# Patient Record
Sex: FEMALE | Race: WHITE | Employment: OTHER | URBAN - METROPOLITAN AREA
[De-identification: names, ages, dates, MRNs, and addresses within clinical notes are randomized per-mention and may not be internally consistent; named-entity substitution may affect disease eponyms.]

---

## 2017-01-03 ENCOUNTER — TRANSFERRED RECORDS (OUTPATIENT)
Dept: HEALTH INFORMATION MANAGEMENT | Facility: CLINIC | Age: 72
End: 2017-01-03

## 2017-03-02 ENCOUNTER — HOSPITAL ENCOUNTER (OUTPATIENT)
Dept: CARDIAC REHAB | Facility: CLINIC | Age: 72
End: 2017-03-02
Attending: PHYSICIAN ASSISTANT
Payer: MEDICARE

## 2017-03-02 VITALS — HEIGHT: 64 IN | WEIGHT: 160.8 LBS | BODY MASS INDEX: 27.45 KG/M2

## 2017-03-02 PROCEDURE — 93798 PHYS/QHP OP CAR RHAB W/ECG: CPT | Performed by: OCCUPATIONAL THERAPIST

## 2017-03-02 PROCEDURE — 40000575 ZZH STATISTIC OP CARDIAC VISIT #2: Performed by: OCCUPATIONAL THERAPIST

## 2017-03-02 PROCEDURE — 40000116 ZZH STATISTIC OP CR VISIT: Performed by: OCCUPATIONAL THERAPIST

## 2017-03-02 PROCEDURE — 93797 PHYS/QHP OP CAR RHAB WO ECG: CPT | Mod: 59 | Performed by: OCCUPATIONAL THERAPIST

## 2017-03-02 ASSESSMENT — 6 MINUTE WALK TEST (6MWT)
MALE CALC: 1415.32
PREDICTED: 1423.95
GENDER SELECTION: FEMALE
FEMALE CALC: 1398.8
TOTAL DISTANCE WALKED (FT): 505

## 2017-03-02 NOTE — PROGRESS NOTES
Heather Martin 71 year old  03/02/17 0800   Session   Session Initial Evaluation and Exercise Prescription   Certified through this date 03/31/17   I have established, reviewed and made necessary changes to the individualized treatment plan and exercise prescription for this patient.    Physician Name (printed): ________________________   Date: _______  Time: ______    Physician Signature: ___________________________________________   Cardiac Rehab Assessment   Cardiac Rehab Assessment On 12/31/16 Pt had SOB and was admitted to [facility] where she had CABG x2. She attended TCU for 4 weeks where she worked with physical therapists and nutritionists in order to help prepare her to live at home alone. She is struggling with making changes to her diet because she only cooks for herself. Pt is interested in attending nutrition 1 and 2 in order to learn more about healthy food options for 1. She is also interested in attending stress management class as she has had some stress with her recent health problems. She has used reading as a way to distract herself from stress, however, she would like to learn more options for stress management. Pt is deconditioned and has not been exercising due to fear that something will happen. She would like to gain confidence in order to help her start exercising at her gym in her apartment conplex at least 2x a week. Pt's BMI is 27 but she is not interested in losing weight as she has already lost 40lbs in the last year. She has arthritis; hip and knee replacement on the right side.  Pt is on Metformin but does not check her BG levels at home. She is currently using a cane for confidence as she does not have very good balance right now. Skilled therapy is needed for education on nutrition and stress management, behavioral counseling in exercise adherance as well as continued monitored exercise in order to gain confidence to return to ADLs. The patient's history and clinical status  including hemodynamics and ECG were evaluated.  The patient was assessed to be stable and appropriate to begin exercise.   The patient's functional capacity and exercise prescription were determined by the completion of the 6 minute walk test.  See results below.  The patient was oriented to the program.  Risk factor profile was completed. Goals and objectives were discussed. CV response was WNL. No symptoms, complaints or pain were reported. Good prognosis for reaching above goals. Skilled therapy is necessary in order to monitor CV response to exercise, to provide education on risk factors and behavior change counseling needed to achieve patient's goals.  Plan to progress to 30-40 minutes of exercise prior to discharge from cardiac rehab. Will start with shorter bouts initially.  Initial THR of 20-30 beats above RHR; Effort rating of 4-6.  Initiate muscle conditioning as appropriate.  Provide risk factor education and behavior change counseling.     General Information   Treatment Diagnosis Coronary Artery Bypass Surgery   Date of Treatment Diagnosis 01/01/17   Significant Past CV History None   Comorbidities DM   Lead up symptoms Couldn't breathe, twinge in chest   Hospital Location [facility]   Hospital Discharge Date 01/07/17   Signs and Symptoms Post Hospital Discharge Appetite  (decreased appitite, celulitis w/ some drainage on left leg-inner knee)   Comments Pt was in the TCU for 4 weeks. She lives alone    Outpatient Cardiac Rehab Start Date 03/02/17   Primary Physician Ryanne Gonzáles    Primary Physician Follow Up Completed   Surgeon Dr Placido Rodriguez    Surgeon Follow Up NA   Cardiologist Dr. Chen    Cardiologist Follow Up Completed   Ejection Fraction <35%    Risk Stratification High   Summary of Cath Report   Summary of Cath Report Available   Date Performed 01/09/17   Left Main 90% stenosis   LAD 80% ostial stenosis   RCA 80% stenosis   Living and Work Status    Living Arrangements and Social  "Status independent living facility   Support System Live alone   Return to Employment Retired   Occupation Transit  for [employer]   Preventative Medications   CMS recommended medications Beta Blocker;Antiplatelets;Lipid Lowering;Influenza vaccination;Pneumonia vaccination   Falls Screen   Have you fallen two or more times in the past year? No   Have you fallen and had an injury in the past year? No   Referral Initiated to Physical Therapy Patient recently attended Physical Therapy   Pain   Patient Currently in Pain No   Physical Assessments   Incisions WNL   Edema +2 Mild   Right Lung Sounds normal   Left Lung Sounds normal   Limitations Orthopedic;Arthritis  (knee and hip replacement on right side)   Comments Pt had a hip and knee replacement on the right side but has not had any pain since the [facility].    Individualized Treatment Plan   Monitored Sessions Scheduled 24   Monitored Sessions Attended 1   Oxygen   Supplemental Oxygen needed No   Nutrition Management - Weight Management   Assessment Initial Assessment   Age 71   Weight 72.9 kg (160 lb 12.8 oz)   Height 1.626 m (5' 4\")   BMI (Calculated) 27.66   Initial Rate Your Plate Score. Dietary tool to assess eating patterns. Scores range from 24 to 72. The higher the score the healthier the eating pattern. 50   Weight Management Comments Pt has some swelling from fluid in ankles. Pt weighs herself every day and is pretty happy with her weight right now. She was recently taken off her diuretic. Pt does not want to go below 150# as she has already lost 40# in the last year    Nutrition Management - Lipids   Lipids Labs Not Available   Prescribed Lipid Medication Yes   Statin Intensity High Intensity   Lipid Comments Pt will bring in a list of her lipid panel    Nutrition Management - Diabetes   Diabetes Type II   Do you Monitor BS at Home? No   Diabetes Medication Prescribed Yes, Oral Medication   Diabetes Comments Pt will have A1C in April and will " bring that report in    Nutrition Management Summary   Dietary Recommendations Low Sodium;Low Fat  (Doesnt remember )   Stages of Change for Diet Compliance Contemplation   Interventions Planned Attend Nutrition Education Class(es);Educate on Benefits of Exercise;Instruct on Label Reading;Refer for Individual Diet Consultation   Patient Goals Goal #1   Goal #1 Description Pt will attend nutrition 1 and 2 in order to learn more about how to cook low sodium and fat diet for 1 person.    Goal #1 Target Date 04/19/17   Nutrition Summary Comments Pt lives by herself and finds it difficult to maintain a low sodium diet with all of the prepackaged foods. She would like to learn more about how to lower sodium and fat and what foods she is allowed to eat.    Nutrition Target Outcome Hb A1C < 7.0;Total Chol < 150, HDL > 40 (M), HDL > 50 (W), LDL < 70, Trig < 150;BMI < 25   Psychosocial Management   Psychosocial Assessment Initial   Is there history of clinical depression or increased risk of depression? No previous history   Current Level of Stress per Patient Report Mild   Current Coping Skills Uses Stress Management/Relaxation Techniques   Initial Patient Health Questionnaire -9 Score (PHQ-9) for depression. 5-9 Minimal symptoms, 10-14 Minor depression, 15-19 Major depression, moderately severe, > 20 Major depression, severe  0   Initial [facility] Survey score.  Quality of Life:   If total score > 25 review individual areas where patient rated a 4 or 5.  Consider patients current medical condition and what role that plays on the score.   Adjust treatment protocol to improve areas of concern.  Consider the following:  PHQ9 score, DASI, and re-assessment within the next 30 days to assist with developing treatments.  16   Stages of Change Contemplation   Interventions Planned Patient to verbalize understanding of behavioral assessment results;Patient to verbalize understanding of negative impact of stress to personal  health;Patient to attend stress management class(es);Patient interested in implementing one strategy to reduce current level of stress/anxiety;Patient will practice coping/stress management techniques;Provide resources for stress relaxation   Patient Goal Yes   Goal Description Pt will attend the stress management class to learn techniques on how to manage stress    Goal Target Date 04/26/17   Psychosocial Comments Pt has never delt with depression and doesnt think it is or will be a problem. She does have some stress from her health and great neices and nephews and would like to find some more techniques for managing stress.    Psychosocial Target Outcome Maximize coping skills   Other Core Components - Hypertension   History of or Diagnosis of Hypertension Yes   Currently taking Anti-Hypertensives Yes;Beta blocker   Other Core Components - Tobacco   History of Tobacco Use Yes   Quit Date or Planned Quit Date (1997)   Tobacco Use Status Former (Quit > 6 mo ago)   Tobacco Habit Cigarettes   Tobacco Use per Day (average) 1   Years of Tobacco Use 30    Stages of Change Maintenance   Other Core Components Summary   Interventions Planned Attend education class on Blood Pressure;List benefits of weight management;Educate on importance of maintaining low sodium diet;Educate on the use of 'Stop Light' tool;Educate on importance of monitoring daily weight;Attend education class(es) on Nutrition   Activity/Exercise History   Activity/Exercise Assessment Initial   Activity/Exercise Status prior to event? Sedentary   Number of Days Currently participating in Moderate Physical Activity? 0   Number of Days Currently performing  Aerobic Exercise (including rehab)? 0   Number of Minutes per Session Currently of Aerobic Exercise (average)? 0   Current Stage of Change (Physical Activity) Contemplation   Current Stage of Change (Aerobic Exercise) Preparation   Patient Goals Goal #1   Goal #1 Description Pt will attend rehab 2-3 x  weeks and will exercise 1-2x a week at her home gym.    Goal #1 Target Date 03/23/17   Goal #1 Progress Towards Goal Pt has access to TM and NuStep at her gym in her apartment complex   Activity/Exercise Comments Pt was sedentary pre-op and would like to gain confidence in order to exercise on her own. Currently, she is very timid about doing anything activity related without being monitored.    Activity/Exercise Target Outcome An Accumulation of 150  Minutes of Aerobic Activity per Week   Exercise Assessment   6 Minute Walk Predicted - Gender Selection Female   6 Minute Walk Predicted (Male) 1415.32   6 Minute Walk Predicted (Female) 1398.8   Initial 6 Minute Walk Distance (Feet) 505 ft   Resting HR 61 bpm   Exercise HR 75 bpm   Post Exercise HR 66 bpm   Resting /60   Exercise /68   Post Exercise /62   Effort Rating 5   Current MET Level 1.7   MET Level Goal 3-3.5   ECG Rhythm Normal sinus rhythm   Ectopy None   Current Symptoms Fatigue   Limitations/Restrictions Orthopedic (see comments)  (Knee and hip replacement )   Exercise Prescription   Mode Nustep;Treadmill;Weights;Arm Ergometer   Duration/Time 15-30 min   Frequency 3 daysweek   THR (85% of age predicted max HR) 125.8   OMNI Effort Rating (0-10 Scale) 4-6/10   Progression Total exercise time of 20-30 minutes;Aerobic exercise to OMNI rating of 6 or below and at or below THR;Progress peak intensity by 1/4 MET per week;Intermittent bouts   Comments Pt has access to NuStep at home gym but has never tried the TM before. May need some assistance for the first session or two.    Recommended Home Exercise   Type of Exercise Walking;Other (comments);Treadmill  (NuStep )   Frequency (days per week) 1-2   Duration (minutes per session) 15-30 min   Effort Rating Recommended 4-6/10   30 Day Exercise Plan Pt will work out at rehab 2-3x a week and 1-2 x a week for 20minutes at her gym at home.    Current Home Exercise   Type of Exercise None    Follow-up/On-going Support   Provider follow-up needed on the following No follow-up needed   Learning Assessment   Learner Patient   Primary Language English   Preferred Learning Style (hands on )   Barriers to Learning No barriers noted   Patient Education   Education recommended Anatomy and Physiology of the Heart;Blood Pressure;Exercise Principles;Medication Overview;Nutrition;Stress Management

## 2017-03-15 ENCOUNTER — HOSPITAL ENCOUNTER (OUTPATIENT)
Dept: CARDIAC REHAB | Facility: CLINIC | Age: 72
End: 2017-03-15
Attending: PHYSICIAN ASSISTANT
Payer: MEDICARE

## 2017-03-15 PROCEDURE — 93798 PHYS/QHP OP CAR RHAB W/ECG: CPT | Performed by: REHABILITATION PRACTITIONER

## 2017-03-15 PROCEDURE — 40000116 ZZH STATISTIC OP CR VISIT: Performed by: REHABILITATION PRACTITIONER

## 2017-03-17 ENCOUNTER — HOSPITAL ENCOUNTER (OUTPATIENT)
Dept: CARDIAC REHAB | Facility: CLINIC | Age: 72
End: 2017-03-17
Attending: PHYSICIAN ASSISTANT
Payer: MEDICARE

## 2017-03-17 PROCEDURE — 40000116 ZZH STATISTIC OP CR VISIT

## 2017-03-17 PROCEDURE — 93798 PHYS/QHP OP CAR RHAB W/ECG: CPT

## 2017-03-17 ASSESSMENT — 6 MINUTE WALK TEST (6MWT)
GENDER SELECTION: FEMALE
TOTAL DISTANCE WALKED (FT): 505

## 2017-03-20 ENCOUNTER — HOSPITAL ENCOUNTER (OUTPATIENT)
Dept: CARDIAC REHAB | Facility: CLINIC | Age: 72
End: 2017-03-20
Attending: PHYSICIAN ASSISTANT
Payer: MEDICARE

## 2017-03-20 VITALS — BODY MASS INDEX: 27.68 KG/M2 | WEIGHT: 162.1 LBS | HEIGHT: 64 IN

## 2017-03-20 PROCEDURE — 93798 PHYS/QHP OP CAR RHAB W/ECG: CPT | Performed by: REHABILITATION PRACTITIONER

## 2017-03-20 PROCEDURE — 40000116 ZZH STATISTIC OP CR VISIT: Performed by: REHABILITATION PRACTITIONER

## 2017-03-20 ASSESSMENT — 6 MINUTE WALK TEST (6MWT)
GENDER SELECTION: FEMALE
MALE CALC: 1412.92
FEMALE CALC: 1394.64
PREDICTED: 1421.53
TOTAL DISTANCE WALKED (FT): 505

## 2017-03-20 NOTE — PROGRESS NOTES
03/20/17 1300   Session  Heather Martin  1945    Physician cosignature/electronic signature indicates approval of this ITP document. I have established, reviewed and made necessary changes to the individualized treatment plan and exercise prescription for this patient.       Session 30 Day Individualized Treatment Plan  (Progress update done)   Certified through this date 04/22/17   Cardiac Rehab Assessment   Cardiac Rehab Assessment On 12/31/16 Pt had SOB and was admitted to [facility] where she had CABG x2. She attended TCU for 4 weeks where she worked with physical therapists and nutritionists in order to help prepare her to live at home alone. She is struggling with making changes to her diet because she only cooks for herself. Pt is interested in attending nutrition 1 and 2 in order to learn more about healthy food options for 1. She is also interested in attending stress management class as she has had some stress with her recent health problems. She has used reading as a way to distract herself from stress, however, she would like to learn more options for stress management. Pt is deconditioned and has not been exercising due to fear that something will happen. She would like to gain confidence in order to help her start exercising at her gym in her apartment conplex at least 2x a week. Pt's BMI is 27 but she is not interested in losing weight as she has already lost 40lbs in the last year. She has arthritis; hip and knee replacement on the right side.  Pt is on Metformin but does not check her BG levels at home. She is currently using a cane for confidence as she does not have very good balance right now. 3/20/17 Progress update done today. PT has been making gradual progress with rehab. She has returned to most ADL's. She is having some anxiety, and has made an appointment with her primary MD at the beginning of April. She plans to attend most of the education classes and is looking forward to  learning more about heart healthy eating. She has been using the Nustep at her place of residence. They have a workout room there. Skilled therapy is needed for education on nutrition and stress management, behavioral counseling in exercise adherance as well as continued monitored exercise in order to gain confidence to return to ADLs.    General Information   Treatment Diagnosis Coronary Artery Bypass Surgery   Date of Treatment Diagnosis 01/01/17   Significant Past CV History None   Comorbidities DM   Lead up symptoms Couldn't breathe, twinge in chest   Hospital Location [facility]   Hospital Discharge Date 01/07/17   Signs and Symptoms Post Hospital Discharge Anxiety  (Appetite is improved)   Outpatient Cardiac Rehab Start Date 03/02/17   Primary Physician Ryanne Gonzáles    Primary Physician Follow Up Completed  (she has another appointment scheduled in April)   Surgeon Dr Placido Rodriguez    Surgeon Follow Up NA   Cardiologist Dr. Chen    Cardiologist Follow Up Completed   Ejection Fraction <35%    Risk Stratification High   Summary of Cath Report   Summary of Cath Report Available   Date Performed 01/09/17   Left Main 90% stenosis   LAD 80% ostial stenosis   RCA 80% stenosis   Living and Work Status    Living Arrangements and Social Status independent living facility   Support System Live alone   Return to Employment Retired   Occupation Transit  for [employer]   Preventative Medications   CMS recommended medications Beta Blocker;Antiplatelets;Lipid Lowering;Influenza vaccination;Pneumonia vaccination   Falls Screen   Have you fallen two or more times in the past year? No   Have you fallen and had an injury in the past year? No   Referral Initiated to Physical Therapy Patient recently attended Physical Therapy   Pain   Patient Currently in Pain No   Physical Assessments   Incisions WNL   Edema +1 Trace   Right Lung Sounds normal   Left Lung Sounds normal   Limitations  "Orthopedic;Arthritis  (knee and hip replacement on right side)   Comments Pt had a hip and knee replacement on the right side but has not had any pain since the sugery.    Individualized Treatment Plan   Monitored Sessions Scheduled 24   Monitored Sessions Attended 4   Oxygen   Supplemental Oxygen needed No   Nutrition Management - Weight Management   Assessment Re-assessment   Age 71   Weight 73.5 kg (162 lb 1.6 oz)   Height 1.626 m (5' 4.02\")   BMI (Calculated) 27.87   Initial Rate Your Plate Score. Dietary tool to assess eating patterns. Scores range from 24 to 72. The higher the score the healthier the eating pattern. 50   Weight Management Comments PT weighs herself every day and is pretty happy with her weight right now.  Pt does not want to go below 150# as she has already lost 40# in the last year    Nutrition Management - Lipids   Lipids Labs Not Available   Prescribed Lipid Medication Yes   Statin Intensity High Intensity   Lipid Comments Pt will bring in a list of her lipid panel    Nutrition Management - Diabetes   Diabetes Type II   Do you Monitor BS at Home? No   Diabetes Medication Prescribed Yes, Oral Medication   Diabetes Comments Pt will have A1C in April and will bring that report in    Nutrition Management Summary   Dietary Recommendations Low Sodium;Low Fat  (Doesnt remember )   Stages of Change for Diet Compliance Preparation   Interventions Planned Attend Nutrition Education Class(es);Educate on Benefits of Exercise;Instruct on Label Reading;Refer for Individual Diet Consultation   Patient Goals Goal #1   Goal #1 Description Pt will attend nutrition 1 and 2 in order to learn more about how to cook low sodium and fat diet for 1 person.    Goal #1 Target Date 04/19/17   Goal #1 Progress Towards Goal 3/20/17 PT will attend nutrition classes as offered throughout the rehab. She has been looking on the computer for heart healthy recipes. She eats out about 1 time per week.    Nutrition Summary " Comments Pt lives by herself and finds it difficult to maintain a low sodium diet with all of the prepackaged foods. She would like to learn more about how to lower sodium and fat and what foods she is allowed to eat.    Nutrition Target Outcome Hb A1C < 7.0;Total Chol < 150, HDL > 40 (M), HDL > 50 (W), LDL < 70, Trig < 150;BMI < 25   Psychosocial Management   Psychosocial Assessment Re-assessment   Is there history of clinical depression or increased risk of depression? No previous history   Current Level of Stress per Patient Report Mild   Current Coping Skills Uses Stress Management/Relaxation Techniques   Initial Patient Health Questionnaire -9 Score (PHQ-9) for depression. 5-9 Minimal symptoms, 10-14 Minor depression, 15-19 Major depression, moderately severe, > 20 Major depression, severe  0   Initial [facility] Survey score.  Quality of Life:   If total score > 25 review individual areas where patient rated a 4 or 5.  Consider patients current medical condition and what role that plays on the score.   Adjust treatment protocol to improve areas of concern.  Consider the following:  PHQ9 score, DASI, and re-assessment within the next 30 days to assist with developing treatments.  16   Stages of Change Contemplation   Interventions Planned Patient to verbalize understanding of behavioral assessment results;Patient to verbalize understanding of negative impact of stress to personal health;Patient to attend stress management class(es);Patient interested in implementing one strategy to reduce current level of stress/anxiety;Patient will practice coping/stress management techniques;Provide resources for stress relaxation   Patient Goal Yes   Goal Description Pt will attend the stress management class to learn techniques on how to manage stress    Goal Target Date 04/26/17   Progress Towards Goal 3/20/17 PT will attend classes. She has been waking up with some anxiety regarding her diagnosis. She will follow up  with her primary MD.    Psychosocial Comments Pt has never delt with depression and doesnt think it is or will be a problem. She does have some stress from her health and great neices and nephews and would like to find some more techniques for managing stress.    Psychosocial Target Outcome Maximize coping skills   Other Core Components - Hypertension   History of or Diagnosis of Hypertension Yes   Currently taking Anti-Hypertensives Yes;Beta blocker   Other Core Components - Tobacco   History of Tobacco Use Yes   Quit Date or Planned Quit Date (1997)   Tobacco Use Status Former (Quit > 6 mo ago)   Tobacco Habit Cigarettes   Tobacco Use per Day (average) 1   Years of Tobacco Use 30    Stages of Change Maintenance   Other Core Components Summary   Interventions Planned Attend education class on Blood Pressure;List benefits of weight management;Educate on importance of maintaining low sodium diet;Educate on the use of 'Stop Light' tool;Educate on importance of monitoring daily weight;Attend education class(es) on Nutrition   Activity/Exercise History   Activity/Exercise Assessment Re-assessment   Activity/Exercise Status prior to event? Sedentary   Number of Days Currently participating in Moderate Physical Activity? 0   Number of Days Currently performing  Aerobic Exercise (including rehab)? 3  (plus 20 minutes on days off from rehab)   Number of Minutes per Session Currently of Aerobic Exercise (average)? 20 at home, 30 in rehab   Current Stage of Change (Physical Activity) Preparation   Current Stage of Change (Aerobic Exercise) Action   Patient Goals Goal #1   Goal #1 Description Pt will attend rehab 2-3 x weeks and will exercise 1-2x a week at her home gym.    Goal #1 Target Date 03/23/17   Goal #1 Progress Towards Goal Pt has access to TM and NuStep at her gym in her apartment complex 3/20/17 PT has been using her Nustep at home for 20 minutes on days off from rehab.    Activity/Exercise Comments Pt was  sedentary pre-op and would like to gain confidence in order to exercise on her own. Currently, she is very timid about doing anything activity related without being monitored.    Activity/Exercise Target Outcome An Accumulation of 150  Minutes of Aerobic Activity per Week   Exercise Assessment   6 Minute Walk Predicted - Gender Selection Female   6 Minute Walk Predicted (Male) 1412.92   6 Minute Walk Predicted (Female) 1394.64   Initial 6 Minute Walk Distance (Feet) 505 ft   Resting HR 72 bpm   Exercise HR 90 bpm   Post Exercise HR 62 bpm   Resting /68   Exercise /78   Post Exercise BP 80/50   Effort Rating 5   Current MET Level 2.5   MET Level Goal 3-3.5   ECG Rhythm Normal sinus rhythm   Ectopy None   Current Symptoms Fatigue   Limitations/Restrictions Orthopedic (see comments)  (Knee and hip replacement )   Exercise Prescription   Mode Nustep;Treadmill;Weights;Arm Ergometer   Duration/Time 15-30 min   Frequency 3 daysweek   THR (85% of age predicted max HR) 125.8   OMNI Effort Rating (0-10 Scale) 4-6/10   Progression Total exercise time of 20-30 minutes;Aerobic exercise to OMNI rating of 6 or below and at or below THR;Progress peak intensity by 1/4 MET per week;Intermittent bouts   Comments Pt has access to NuStep at home gym but has never tried the TM before. May need some assistance for the first session or two.    Recommended Home Exercise   Type of Exercise Walking;Other (comments);Treadmill  (NuStep )   Frequency (days per week) 1-2   Duration (minutes per session) 15-30 min   Effort Rating Recommended 4-6/10   30 Day Exercise Plan Pt will work out at rehab 2-3x a week and 1-2 x a week for 20minutes at her gym at home.    Current Home Exercise   Type of Exercise Other (comments)   Frequency (days per week) on days off from rehab   Duration (minutes per session) 20   Follow-up/On-going Support   Provider follow-up needed on the following Other (see comments)   Learning Assessment   Learner Patient    Primary Language English   Preferred Learning Style (hands on )   Barriers to Learning No barriers noted   Patient Education   Education recommended Anatomy and Physiology of the Heart;Blood Pressure;Exercise Principles;Medication Overview;Nutrition;Stress Management

## 2017-03-22 ENCOUNTER — HOSPITAL ENCOUNTER (OUTPATIENT)
Dept: CARDIAC REHAB | Facility: CLINIC | Age: 72
End: 2017-03-22
Attending: PHYSICIAN ASSISTANT
Payer: MEDICARE

## 2017-03-22 PROCEDURE — 40000116 ZZH STATISTIC OP CR VISIT: Performed by: OCCUPATIONAL THERAPIST

## 2017-03-22 PROCEDURE — 93798 PHYS/QHP OP CAR RHAB W/ECG: CPT | Performed by: OCCUPATIONAL THERAPIST

## 2017-03-24 ENCOUNTER — HOSPITAL ENCOUNTER (OUTPATIENT)
Dept: CARDIAC REHAB | Facility: CLINIC | Age: 72
End: 2017-03-24
Attending: PHYSICIAN ASSISTANT
Payer: MEDICARE

## 2017-03-24 VITALS — BODY MASS INDEX: 27.84 KG/M2 | WEIGHT: 163.1 LBS | HEIGHT: 64 IN

## 2017-03-24 PROCEDURE — 40000575 ZZH STATISTIC OP CARDIAC VISIT #2: Performed by: REHABILITATION PRACTITIONER

## 2017-03-24 PROCEDURE — 93797 PHYS/QHP OP CAR RHAB WO ECG: CPT | Mod: 59 | Performed by: REHABILITATION PRACTITIONER

## 2017-03-24 PROCEDURE — 40000116 ZZH STATISTIC OP CR VISIT

## 2017-03-24 PROCEDURE — 93798 PHYS/QHP OP CAR RHAB W/ECG: CPT

## 2017-03-24 ASSESSMENT — 6 MINUTE WALK TEST (6MWT)
FEMALE CALC: 1391.23
MALE CALC: 1410.29
TOTAL DISTANCE WALKED (FT): 505
PREDICTED: 1418.89
GENDER SELECTION: FEMALE

## 2017-03-27 ENCOUNTER — HOSPITAL ENCOUNTER (OUTPATIENT)
Dept: CARDIAC REHAB | Facility: CLINIC | Age: 72
End: 2017-03-27
Attending: PHYSICIAN ASSISTANT
Payer: MEDICARE

## 2017-03-27 PROCEDURE — 93797 PHYS/QHP OP CAR RHAB WO ECG: CPT | Performed by: OCCUPATIONAL THERAPIST

## 2017-03-27 PROCEDURE — 40000116 ZZH STATISTIC OP CR VISIT: Performed by: OCCUPATIONAL THERAPIST

## 2017-03-27 PROCEDURE — 93798 PHYS/QHP OP CAR RHAB W/ECG: CPT | Performed by: OCCUPATIONAL THERAPIST

## 2017-03-27 PROCEDURE — 40000575 ZZH STATISTIC OP CARDIAC VISIT #2: Performed by: OCCUPATIONAL THERAPIST

## 2017-03-27 ASSESSMENT — 6 MINUTE WALK TEST (6MWT)
GENDER SELECTION: FEMALE
TOTAL DISTANCE WALKED (FT): 505

## 2017-03-27 NOTE — PROGRESS NOTES
Heather Martin 71 year old   03/27/17 1300   Session   Session 60 Day Individualized Treatment Plan  (Progress update done)   Certified through this date 05/13/17   Cardiac Rehab Assessment   Cardiac Rehab Assessment On 12/31/16 Pt had SOB and was admitted to [facility] where she had CABG x2. She attended TCU for 4 weeks where she worked with physical therapists and nutritionists in order to help prepare her to live at home alone. She is struggling with making changes to her diet because she only cooks for herself. Pt is interested in attending nutrition 1 and 2 in order to learn more about healthy food options for 1. She is also interested in attending stress management class as she has had some stress with her recent health problems. She has used reading as a way to distract herself from stress, however, she would like to learn more options for stress management. Pt is deconditioned and has not been exercising due to fear that something will happen. She would like to gain confidence in order to help her start exercising at her gym in her apartment conplex at least 2x a week. Pt's BMI is 27 but she is not interested in losing weight as she has already lost 40lbs in the last year. She has arthritis; hip and knee replacement on the right side.  Pt is on Metformin but does not check her BG levels at home. She is currently using a cane for confidence as she does not have very good balance right now. 3/20/17 Progress update done today. PT has been making gradual progress with rehab. She has returned to most ADL's. She is having some anxiety, and has made an appointment with her primary MD at the beginning of April. She plans to attend most of the education classes and is looking forward to learning more about heart healthy eating. She has been using the Nustep at her place of residence. They have a workout room there. Skilled therapy is needed for education on nutrition and stress management, behavioral  counseling in exercise adherance as well as continued monitored exercise in order to gain confidence to return to ADLs.  3/24/17. 1:1 consult today.  Reviewed risk factor report. Overall PT is feeling well  She is progressing per POC. She has made several diet changes for healthy eating and diabetes control. She is starting to exercise at home.  SHe is hoping to be able to independent of her walker by completion of OPCR.  Recommended PT discuss with MD her A1c, EF, consult with diabetes educator and consulting with endocrinilogist for diabetes management.  Continue with skilled therapy for increased exercise tolerance, risk factor education and behavior change counsling on diet and exercise.   General Information   Treatment Diagnosis Coronary Artery Bypass Surgery   Date of Treatment Diagnosis 01/01/17   Significant Past CV History None   Comorbidities DM   Lead up symptoms Couldn't breathe, twinge in chest   Hospital Location [facility]   Hospital Discharge Date 01/07/17   Signs and Symptoms Post Hospital Discharge Anxiety  (Appetite is improved)   Outpatient Cardiac Rehab Start Date 03/02/17   Primary Physician Ryanne Gonzáles    Primary Physician Follow Up Completed  (she has another appointment scheduled in April)   Surgeon Dr Placido Rodriguez    Surgeon Follow Up NA   Cardiologist Dr. Chen    Cardiologist Follow Up Completed   Ejection Fraction <35%    Risk Stratification High   Summary of Cath Report   Summary of Cath Report Available   Date Performed 01/09/17   Left Main 90% stenosis   LAD 80% ostial stenosis   RCA 80% stenosis   Living and Work Status    Living Arrangements and Social Status independent living facility   Support System Live alone   Return to Employment Retired   Occupation Transit  for [employer]   Preventative Medications   CMS recommended medications Beta Blocker;Antiplatelets;Lipid Lowering;Influenza vaccination;Pneumonia vaccination   Falls Screen   Have you fallen two  or more times in the past year? No   Have you fallen and had an injury in the past year? No   Referral Initiated to Physical Therapy Patient recently attended Physical Therapy   Pain   Patient Currently in Pain No   Physical Assessments   Incisions WNL   Edema +1 Trace   Right Lung Sounds normal   Left Lung Sounds normal   Limitations Orthopedic;Arthritis  (knee and hip replacement on right side)   Comments Pt had a hip and knee replacement on the right side but has not had any pain since the sugery.    Individualized Treatment Plan   Monitored Sessions Scheduled 24   Monitored Sessions Attended 6   Oxygen   Supplemental Oxygen needed No   Nutrition Management - Weight Management   Assessment Re-assessment   Age 71   Initial Rate Your Plate Score. Dietary tool to assess eating patterns. Scores range from 24 to 72. The higher the score the healthier the eating pattern. 50   Weight Management Comments PT weighs herself every day and is pretty happy with her weight right now.  Pt does not want to go below 150# as she has already lost 40# in the last year    Nutrition Management - Lipids   Lipids Labs Available   Date 03/10/17   Total Cholesterol 140   Triglycerides 140   HDL 29   LDL 83   Prescribed Lipid Medication Yes   Statin Intensity High Intensity   Lipid Comments Pt will bring in a list of her lipid panel  3/24/17.  Reviewed values and recommneded outcome goals.   Nutrition Management - Diabetes   Diabetes Type II   Do you Monitor BS at Home? No   Diabetes Medication Prescribed Yes, Oral Medication   Diabetes Comments Pt will have A1C in April and will bring that report in    Nutrition Management Summary   Dietary Recommendations Low Sodium;Low Fat  (Doesnt remember )   Stages of Change for Diet Compliance Preparation   Interventions Planned Attend Nutrition Education Class(es);Educate on Benefits of Exercise;Instruct on Label Reading;Refer for Individual Diet Consultation   Patient Goals Goal #1   Goal #1  Description Pt will attend nutrition 1 and 2 in order to learn more about how to cook low sodium and fat diet for 1 person.    Goal #1 Target Date 04/19/17   Goal #1 Progress Towards Goal 3/20/17 PT will attend nutrition classes as offered throughout the rehab. She has been looking on the computer for heart healthy recipes. She eats out about 1 time per week.  3/24/17Planning to attend nutrition education classes.  She has made sevarl diet changes including no white bread or pastiries, increased fruit and fish, decreased sodium.   Nutrition Summary Comments Pt lives by herself and finds it difficult to maintain a low sodium diet with all of the prepackaged foods. She would like to learn more about how to lower sodium and fat and what foods she is allowed to eat.    Nutrition Target Outcome Hb A1C < 7.0;Total Chol < 150, HDL > 40 (M), HDL > 50 (W), LDL < 70, Trig < 150;BMI < 25   Psychosocial Management   Psychosocial Assessment Re-assessment   Is there history of clinical depression or increased risk of depression? No previous history   Current Level of Stress per Patient Report Mild   Current Coping Skills Uses Stress Management/Relaxation Techniques   Initial Patient Health Questionnaire -9 Score (PHQ-9) for depression. 5-9 Minimal symptoms, 10-14 Minor depression, 15-19 Major depression, moderately severe, > 20 Major depression, severe  0   Initial [facility] Survey score.  Quality of Life:   If total score > 25 review individual areas where patient rated a 4 or 5.  Consider patients current medical condition and what role that plays on the score.   Adjust treatment protocol to improve areas of concern.  Consider the following:  PHQ9 score, DASI, and re-assessment within the next 30 days to assist with developing treatments.  16   Stages of Change Contemplation   Interventions Planned Patient to verbalize understanding of behavioral assessment results;Patient to verbalize understanding of negative impact of  stress to personal health;Patient to attend stress management class(es);Patient interested in implementing one strategy to reduce current level of stress/anxiety;Patient will practice coping/stress management techniques;Provide resources for stress relaxation   Patient Goal Yes   Goal Description Pt will attend the stress management class to learn techniques on how to manage stress    Goal Target Date 04/26/17   Progress Towards Goal 3/20/17 PT will attend classes. She has been waking up with some anxiety regarding her diagnosis. She will follow up with her primary MD.  3/24/17.  She did not mention any anxiety today.  She will attend class on 3/27/17.   Psychosocial Comments Pt has never delt with depression and doesnt think it is or will be a problem. She does have some stress from her health and great neices and nephews and would like to find some more techniques for managing stress.    Psychosocial Target Outcome Maximize coping skills   Other Core Components - Hypertension   History of or Diagnosis of Hypertension Yes   Currently taking Anti-Hypertensives Yes;Beta blocker   Hypertension Comments 3/24/17.  BP well controlled.   Other Core Components - Tobacco   History of Tobacco Use Yes   Quit Date or Planned Quit Date (1997)   Tobacco Use Status Former (Quit > 6 mo ago)   Tobacco Habit Cigarettes   Tobacco Use per Day (average) 1   Years of Tobacco Use 30    Stages of Change Maintenance   Other Core Components Summary   Interventions Planned Attend education class on Blood Pressure;List benefits of weight management;Educate on importance of maintaining low sodium diet;Educate on the use of 'Stop Light' tool;Educate on importance of monitoring daily weight;Attend education class(es) on Nutrition   Activity/Exercise History   Activity/Exercise Assessment Re-assessment   Activity/Exercise Status prior to event? Sedentary   Number of Days Currently participating in Moderate Physical Activity? 0   Number of Days  Currently performing  Aerobic Exercise (including rehab)? 3  (plus 20 minutes on days off from rehab)   Number of Minutes per Session Currently of Aerobic Exercise (average)? 20 at home, 30 in rehab   Current Stage of Change (Physical Activity) Preparation   Current Stage of Change (Aerobic Exercise) Preparation   Patient Goals Goal #1   Goal #1 Description Pt will attend rehab 2-3 x weeks and will exercise 1-2x a week at her home gym.    Goal #1 Target Date 03/23/17   Goal #1 Progress Towards Goal Pt has access to TM and NuStep at her gym in her apartment complex 3/20/17 PT has been using her Nustep at home for 20 minutes on days off from rehab.  3/24/17.  Attending rehab regularly. She is just starting to exercise outside of rehab (1 time to date).   Activity/Exercise Comments Pt was sedentary pre-op and would like to gain confidence in order to exercise on her own. Currently, she is very timid about doing anything activity related without being monitored.    Activity/Exercise Target Outcome An Accumulation of 150  Minutes of Aerobic Activity per Week   Exercise Assessment   6 Minute Walk Predicted - Gender Selection Female   Initial 6 Minute Walk Distance (Feet) 505 ft   Resting HR 69 bpm   Exercise HR 90 bpm   Post Exercise HR 62 bpm   Resting /62   Exercise /62   Post Exercise /54   Pre  mg/dL   Post  mg/dL   Effort Rating 5   Current MET Level 2.5   MET Level Goal 3-3.5   ECG Rhythm Normal sinus rhythm   Ectopy None   Current Symptoms Denies symptoms   Limitations/Restrictions Orthopedic (see comments)  (Knee and hip replacement )   Exercise Prescription   Mode Nustep;Treadmill;Weights;Arm Ergometer   Duration/Time 15-30 min   Frequency 3 daysweek   THR (85% of age predicted max HR) 125.8   OMNI Effort Rating (0-10 Scale) 4-6/10   Progression Total exercise time of 20-30 minutes;Aerobic exercise to OMNI rating of 6 or below and at or below THR;Progress peak intensity by 1/4 MET  per week;Intermittent bouts   Comments Pt has access to NuStep at home gym but has never tried the TM before. May need some assistance for the first session or two.    Recommended Home Exercise   Type of Exercise Walking;Other (comments);Treadmill  (NuStep )   Frequency (days per week) 1-2   Duration (minutes per session) 15-30 min   Effort Rating Recommended 4-6/10   30 Day Exercise Plan Pt will work out at rehab 2-3x a week and 1-2 x a week for 20minutes at her gym at home.    Current Home Exercise   Type of Exercise None  (planning to start this week.)   Follow-up/On-going Support   Provider follow-up needed on the following Other (see comments)   Learning Assessment   Learner Patient   Primary Language English   Preferred Learning Style (hands on )   Barriers to Learning No barriers noted   Patient Education   Education recommended Anatomy and Physiology of the Heart;Blood Pressure;Exercise Principles;Medication Overview;Nutrition;Stress Management   Education classes attended Stress Management   Physician cosignature/electronic signature indicates approval of this ITP document. I have established, reviewed and made necessary changes to the individualized treatment plan and exercise prescription for this patient.

## 2017-03-29 ENCOUNTER — HOSPITAL ENCOUNTER (OUTPATIENT)
Dept: CARDIAC REHAB | Facility: CLINIC | Age: 72
End: 2017-03-29
Attending: PHYSICIAN ASSISTANT
Payer: MEDICARE

## 2017-03-29 PROCEDURE — 40000575 ZZH STATISTIC OP CARDIAC VISIT #2

## 2017-03-29 PROCEDURE — 93797 PHYS/QHP OP CAR RHAB WO ECG: CPT

## 2017-03-29 PROCEDURE — 40000116 ZZH STATISTIC OP CR VISIT: Performed by: REHABILITATION PRACTITIONER

## 2017-03-29 PROCEDURE — 93798 PHYS/QHP OP CAR RHAB W/ECG: CPT | Performed by: REHABILITATION PRACTITIONER

## 2017-03-31 ENCOUNTER — HOSPITAL ENCOUNTER (OUTPATIENT)
Dept: CARDIAC REHAB | Facility: CLINIC | Age: 72
End: 2017-03-31
Attending: PHYSICIAN ASSISTANT
Payer: MEDICARE

## 2017-03-31 LAB — GLUCOSE BLDC GLUCOMTR-MCNC: 147 MG/DL (ref 70–99)

## 2017-03-31 PROCEDURE — 40000116 ZZH STATISTIC OP CR VISIT: Performed by: OCCUPATIONAL THERAPIST

## 2017-03-31 PROCEDURE — 00000146 ZZHCL STATISTIC GLUCOSE BY METER IP

## 2017-03-31 PROCEDURE — 93798 PHYS/QHP OP CAR RHAB W/ECG: CPT | Performed by: OCCUPATIONAL THERAPIST

## 2017-04-03 ENCOUNTER — HOSPITAL ENCOUNTER (OUTPATIENT)
Dept: CARDIAC REHAB | Facility: CLINIC | Age: 72
End: 2017-04-03
Attending: PHYSICIAN ASSISTANT
Payer: MEDICARE

## 2017-04-03 PROCEDURE — 93798 PHYS/QHP OP CAR RHAB W/ECG: CPT

## 2017-04-03 PROCEDURE — 40000116 ZZH STATISTIC OP CR VISIT

## 2017-04-05 ENCOUNTER — HOSPITAL ENCOUNTER (OUTPATIENT)
Dept: CARDIAC REHAB | Facility: CLINIC | Age: 72
End: 2017-04-05
Attending: PHYSICIAN ASSISTANT
Payer: MEDICARE

## 2017-04-05 LAB
GLUCOSE BLDC GLUCOMTR-MCNC: 138 MG/DL (ref 70–99)
GLUCOSE BLDC GLUCOMTR-MCNC: 97 MG/DL (ref 70–99)

## 2017-04-05 PROCEDURE — 93797 PHYS/QHP OP CAR RHAB WO ECG: CPT | Mod: 59 | Performed by: REHABILITATION PRACTITIONER

## 2017-04-05 PROCEDURE — 40000575 ZZH STATISTIC OP CARDIAC VISIT #2: Performed by: REHABILITATION PRACTITIONER

## 2017-04-05 PROCEDURE — 00000146 ZZHCL STATISTIC GLUCOSE BY METER IP

## 2017-04-05 PROCEDURE — 40000116 ZZH STATISTIC OP CR VISIT: Performed by: REHABILITATION PRACTITIONER

## 2017-04-05 PROCEDURE — 93798 PHYS/QHP OP CAR RHAB W/ECG: CPT | Performed by: REHABILITATION PRACTITIONER

## 2017-04-07 ENCOUNTER — HOSPITAL ENCOUNTER (OUTPATIENT)
Dept: CARDIAC REHAB | Facility: CLINIC | Age: 72
End: 2017-04-07
Attending: PHYSICIAN ASSISTANT
Payer: MEDICARE

## 2017-04-07 PROCEDURE — 40000116 ZZH STATISTIC OP CR VISIT: Performed by: REHABILITATION PRACTITIONER

## 2017-04-07 PROCEDURE — 93798 PHYS/QHP OP CAR RHAB W/ECG: CPT | Performed by: REHABILITATION PRACTITIONER

## 2017-04-14 ENCOUNTER — HOSPITAL ENCOUNTER (OUTPATIENT)
Dept: CARDIAC REHAB | Facility: CLINIC | Age: 72
End: 2017-04-14
Attending: PHYSICIAN ASSISTANT
Payer: MEDICARE

## 2017-04-14 PROCEDURE — 93798 PHYS/QHP OP CAR RHAB W/ECG: CPT

## 2017-04-14 PROCEDURE — 40000116 ZZH STATISTIC OP CR VISIT

## 2017-04-17 ENCOUNTER — HOSPITAL ENCOUNTER (OUTPATIENT)
Dept: CARDIAC REHAB | Facility: CLINIC | Age: 72
End: 2017-04-17
Attending: PHYSICIAN ASSISTANT
Payer: MEDICARE

## 2017-04-17 VITALS — WEIGHT: 164 LBS | BODY MASS INDEX: 28.14 KG/M2

## 2017-04-17 PROCEDURE — 93797 PHYS/QHP OP CAR RHAB WO ECG: CPT | Mod: 59 | Performed by: OCCUPATIONAL THERAPIST

## 2017-04-17 PROCEDURE — 40000116 ZZH STATISTIC OP CR VISIT: Performed by: REHABILITATION PRACTITIONER

## 2017-04-17 PROCEDURE — 93798 PHYS/QHP OP CAR RHAB W/ECG: CPT | Performed by: REHABILITATION PRACTITIONER

## 2017-04-17 PROCEDURE — 40000575 ZZH STATISTIC OP CARDIAC VISIT #2: Performed by: OCCUPATIONAL THERAPIST

## 2017-04-17 ASSESSMENT — 6 MINUTE WALK TEST (6MWT)
GENDER SELECTION: FEMALE
TOTAL DISTANCE WALKED (FT): 505

## 2017-04-17 NOTE — PROGRESS NOTES
Heather Martin 71 year old   3/2/2017 CABG 04/17/17 1400   Session   Session 90 Day Individualized Treatment Plan   Certified through this date 06/10/17   Physician cosignature/electronic signature indicates approval of this ITP document. I have established, reviewed and made necessary changes to the individualized treatment plan and exercise prescription for this patient.   Cardiac Rehab Assessment   Cardiac Rehab Assessment Pt is deconditioned and has not been exercising due to fear that something will happen. She would like to gain confidence in order to help her start exercising at her gym in her apartment [facility] at least 2x a week. Pt's BMI is 27 but she is not interested in losing weight as she has already lost 40lbs in the last year. She has arthritis; hip and knee replacement on the right side.  Pt is on Metformin but does not check her BG levels at home. She is currently using a cane for confidence as she does not have very good balance right now. 3/20/17 Progress update done today. PT has been making gradual progress with rehab. She has returned to most ADL's. She is having some anxiety, and has made an appointment with her primary MD at the beginning of April. She plans to attend most of the education classes and is looking forward to learning more about heart healthy eating. She has been using the Nustep at her place of residence. They have a workout room there. Skilled therapy is needed for education on nutrition and stress management, behavioral counseling in exercise adherance as well as continued monitored exercise in order to gain confidence to return to ADLs.  3/24/17. 1:1 consult today.  Reviewed risk factor report. Overall PT is feeling well  She is progressing per POC. She has made several diet changes for healthy eating and diabetes control. She is starting to exercise at home.  SHe is hoping to be able to independent of her walker by completion of OPCR.  Recommended PT discuss with MD her  A1c, EF, consult with diabetes educator and consulting with endocrinilogist for diabetes management.  Continue with skilled therapy for increased exercise tolerance, risk factor education and behavior change counsling on diet and exercise. 4/17/2017 PT is slowly progressing with the exercise levels. She has attended several of the education classes and is making changes to her diet. Her A1c is now 5.7. PT is feeling more confident with her exercise program and enjoys the support she receives in OPCR. PT would benefit from skilled therapy for support/counseling with the lifestyle changes with diet and exercise to reduce her CV risk.   General Information   Treatment Diagnosis Coronary Artery Bypass Surgery   Date of Treatment Diagnosis 01/01/17   Significant Past CV History None   Comorbidities DM   Lead up symptoms Couldn't breathe, twinge in chest   Hospital Location [facility]   Hospital Discharge Date 01/07/17   Signs and Symptoms Post Hospital Discharge Anxiety  (Appetite is improved)   Outpatient Cardiac Rehab Start Date 03/02/17   Primary Physician Ryanne Gonzáles    Primary Physician Follow Up Completed  (she has another appointment scheduled in April)   Surgeon Dr Placido Rodriguez    Surgeon Follow Up NA   Cardiologist Dr. Chen    Cardiologist Follow Up Completed   Ejection Fraction <35%    Risk Stratification High   Summary of Cath Report   Summary of Cath Report Available   Date Performed 01/09/17   Left Main 90% stenosis   LAD 80% ostial stenosis   RCA 80% stenosis   Living and Work Status    Living Arrangements and Social Status independent living facility   Support System Live alone   Return to Employment Retired   Occupation Transit  for [employer]   Preventative Medications   CMS recommended medications Beta Blocker;Antiplatelets;Lipid Lowering;Influenza vaccination;Pneumonia vaccination   Falls Screen   Have you fallen two or more times in the past year? No   Have you fallen and had  an injury in the past year? No   Referral Initiated to Physical Therapy Patient recently attended Physical Therapy   Pain   Patient Currently in Pain No   Physical Assessments   Incisions WNL   Edema +2 Mild   Right Lung Sounds not assessed   Left Lung Sounds not assessed   Limitations Orthopedic;Arthritis  (knee and hip replacement on right side)   Comments Pt had a hip and knee replacement on the right side but has not had any pain since the sugery.    Individualized Treatment Plan   Monitored Sessions Scheduled 24   Monitored Sessions Attended 14   Oxygen   Supplemental Oxygen needed No   Nutrition Management - Weight Management   Assessment Re-assessment   Age 71   Weight 74.4 kg (164 lb)   Initial Rate Your Plate Score. Dietary tool to assess eating patterns. Scores range from 24 to 72. The higher the score the healthier the eating pattern. 50   Weight Management Comments PT weighs herself every day and is pretty happy with her weight right now.  Pt does not want to go below 150# as she has already lost 40# in the last year. 4/17/2017 PT is hoping to maintain her weight at the current level as she does not want to regain the weight she has lost. Her weight is up slightly today, but she attributes it to overindulging easter weekend.    Nutrition Management - Lipids   Lipids Labs Available   Date 03/10/17   Total Cholesterol 140   Triglycerides 140   HDL 29   LDL 83   Prescribed Lipid Medication Yes   Statin Intensity High Intensity   Lipid Comments Pt will bring in a list of her lipid panel  3/24/17.  Reviewed values and recommneded outcome goals.   Nutrition Management - Diabetes   Diabetes Type II   Do you Monitor BS at Home? No   Diabetes Medication Prescribed Yes, Oral Medication   Hb A1C Date: 04/12/17   Hb A1C Result: 5.7   Diabetes Comments Pt will have A1C in April and will bring that report in. 4/17/2017 New A1c indicates her diabetes is well-controlled.   Nutrition Management Summary   Dietary  Recommendations Low Sodium;Low Fat  (Doesnt remember )   Stages of Change for Diet Compliance Preparation   Interventions Planned Attend Nutrition Education Class(es);Educate on Benefits of Exercise;Instruct on Label Reading;Refer for Individual Diet Consultation   Interventions In Progress or Completed Attended Nutrition Class(es);Understands how to accurately read Food Labels   Patient Goals Goal #1   Goal #1 Description Pt will attend nutrition 1 and 2 in order to learn more about how to cook low sodium and fat diet for 1 person.    Goal #1 Target Date 04/19/17   Goal #1 Progress Towards Goal 3/20/17 PT will attend nutrition classes as offered throughout the rehab. She has been looking on the computer for heart healthy recipes. She eats out about 1 time per week.  3/24/17Planning to attend nutrition education classes.  She has made sevarl diet changes including no white bread or pastiries, increased fruit and fish, decreased sodium. 4/17/2017 PT has attended the nutrition 2 class and plans to attend Nutrition 1 this week. She is reading labels and not adding any salt to food. She has a handout on sodium on her cupboard that she refers to to help keep her on track. Given handout on spices to use instead of salt for flavoring food.   Nutrition Summary Comments Pt lives by herself and finds it difficult to maintain a low sodium diet with all of the prepackaged foods. She would like to learn more about how to lower sodium and fat and what foods she is allowed to eat.    Nutrition Target Outcome Hb A1C < 7.0;Total Chol < 150, HDL > 40 (M), HDL > 50 (W), LDL < 70, Trig < 150;BMI < 25   Psychosocial Management   Psychosocial Assessment Re-assessment   Is there history of clinical depression or increased risk of depression? No previous history   Current Level of Stress per Patient Report Mild   Current Coping Skills Uses Stress Management/Relaxation Techniques   Initial Patient Health Questionnaire -9 Score (PHQ-9) for  depression. 5-9 Minimal symptoms, 10-14 Minor depression, 15-19 Major depression, moderately severe, > 20 Major depression, severe  0   Initial [facility] Survey score.  Quality of Life:   If total score > 25 review individual areas where patient rated a 4 or 5.  Consider patients current medical condition and what role that plays on the score.   Adjust treatment protocol to improve areas of concern.  Consider the following:  PHQ9 score, DASI, and re-assessment within the next 30 days to assist with developing treatments.  16   Stages of Change Contemplation   Interventions Planned Patient to verbalize understanding of behavioral assessment results;Patient to verbalize understanding of negative impact of stress to personal health;Patient to attend stress management class(es);Patient interested in implementing one strategy to reduce current level of stress/anxiety;Patient will practice coping/stress management techniques;Provide resources for stress relaxation   Interventions In Progress or Completed Patient verbalizes understanding of negative impact of stress to personal health;Patient attended stress management class(es);Patient is currently practicing coping/relaxation techniques;Patient was given resources for stress relaxation   Patient Goal Yes   Goal Description Pt will attend the stress management class to learn techniques on how to manage stress    Goal Target Date 04/26/17   Progress Towards Goal 3/20/17 PT will attend classes. She has been waking up with some anxiety regarding her diagnosis. She will follow up with her primary MD.  3/24/17.  She did not mention any anxiety today.  She will attend class on 3/27/17.   Psychosocial Comments Pt has never delt with depression and doesnt think it is or will be a problem. She does have some stress from her health and great neices and nephews and would like to find some more techniques for managing stress. PT is also planning to start chair yoga with is  offered twice a month where she resides.   Psychosocial Target Outcome Maximize coping skills   Other Core Components - Hypertension   History of or Diagnosis of Hypertension Yes   Currently taking Anti-Hypertensives Yes;Beta blocker   Hypertension Comments 3/24/17.  BP well controlled. 4/17/2017 BP has been higher some sessions. Will continue to monitor.   Other Core Components - Tobacco   History of Tobacco Use Yes   Quit Date or Planned Quit Date (1997)   Tobacco Use Status Former (Quit > 6 mo ago)   Tobacco Habit Cigarettes   Tobacco Use per Day (average) 1   Years of Tobacco Use 30    Stages of Change Maintenance   Other Core Components Summary   Interventions Planned Attend education class on Blood Pressure;List benefits of weight management;Educate on importance of maintaining low sodium diet;Educate on the use of 'Stop Light' tool;Educate on importance of monitoring daily weight;Attend education class(es) on Nutrition;Instruct patient on the DASH diet   Interventions In Progress or Completed Instructed on DASH diet;Educated on importance of maintaining low sodium diet   Other Core Components Comments 4/17/2017 Given written information on the DASH diet. PT is watching her salt intake and weight.   Activity/Exercise History   Activity/Exercise Assessment Re-assessment   Activity/Exercise Status prior to event? Sedentary   Number of Days Currently participating in Moderate Physical Activity? 0   Number of Days Currently performing  Aerobic Exercise (including rehab)? 4-5  (plus 20 minutes on days off from rehab)   Number of Minutes per Session Currently of Aerobic Exercise (average)? 30-35   Current Stage of Change (Physical Activity) Preparation   Current Stage of Change (Aerobic Exercise) Preparation   Patient Goals Goal #1   Goal #1 Description Pt will attend rehab 2-3 x weeks and will exercise 1-2x a week at her home gym.    Goal #1 Target Date 03/23/17   Goal #1 Date Met 04/17/17   Goal #1 Progress  Towards Goal Pt has access to TM and NuStep at her gym in her apartment complex 3/20/17 PT has been using her Nustep at home for 20 minutes on days off from rehab.  3/24/17.  Attending rehab regularly. She is just starting to exercise outside of rehab (1 time to date). 4/17/2017 PT is going to her wellness room to exercise where she lives 2 days per week for 30-35 minutes of exercise. She does the nustep and sometimes the TM for a short amount of time. Goal Met.   Activity/Exercise Comments Pt was sedentary pre-op and would like to gain confidence in order to exercise on her own. Currently, she is very timid about doing anything activity related without being monitored.    Activity/Exercise Target Outcome An Accumulation of 150  Minutes of Aerobic Activity per Week   Exercise Assessment   6 Minute Walk Predicted - Gender Selection Female   Initial 6 Minute Walk Distance (Feet) 505 ft   Resting HR 67 bpm   Exercise HR 80 bpm   Post Exercise HR 60 bpm   Resting /60   Exercise /76   Post Exercise /64   Pre  mg/dL   Post  mg/dL   Effort Rating 6   Current MET Level 2.8   MET Level Goal 3-3.5   ECG Rhythm Normal sinus rhythm   Ectopy None   Current Symptoms Denies symptoms   Limitations/Restrictions Orthopedic (see comments)  (Knee and hip replacement )   Exercise Prescription   Mode Nustep;Treadmill;Weights;Arm Ergometer   Duration/Time 30-45 min   Frequency 3 daysweek   THR (85% of age predicted max HR) 125.8   OMNI Effort Rating (0-10 Scale) 4-6/10   Progression Aerobic exercise to OMNI rating of 6 or below and at or below THR;Progress peak intensity by 1/4 MET per week;Total exercise time of 30-45 minutes;Continuous bouts   Comments Pt has access to NuStep at home gym but has never tried the TM before. May need some assistance for the first session or two.    Recommended Home Exercise   Type of Exercise Walking;Other (comments);Treadmill  (NuStep )   Frequency (days per week) 1-2    Duration (minutes per session) 30-45 min   Effort Rating Recommended 4-6/10   30 Day Exercise Plan Pt will work out at rehab 2-3x a week and 1-2 x a week for 20minutes at her gym at home.    Current Home Exercise   Type of Exercise Walking;Treadmill;Other (comments)  (Nustep)   Frequency (days per week) 2   Duration (minutes per session) 30-35   Follow-up/On-going Support   Provider follow-up needed on the following Blood Pressure   Comments /60 today at rest and has been higher some sessions.   Learning Assessment   Learner Patient   Primary Language English   Preferred Learning Style (hands on )   Barriers to Learning No barriers noted   Patient Education   Education recommended Anatomy and Physiology of the Heart;Blood Pressure;Exercise Principles;Medication Overview;Nutrition;Stress Management   Education classes attended Stress Management;Muscle Conditioning;Nutrition

## 2017-04-19 ENCOUNTER — HOSPITAL ENCOUNTER (OUTPATIENT)
Dept: CARDIAC REHAB | Facility: CLINIC | Age: 72
End: 2017-04-19
Attending: PHYSICIAN ASSISTANT
Payer: MEDICARE

## 2017-04-19 PROCEDURE — 93798 PHYS/QHP OP CAR RHAB W/ECG: CPT | Performed by: OCCUPATIONAL THERAPIST

## 2017-04-19 PROCEDURE — 40000116 ZZH STATISTIC OP CR VISIT: Performed by: OCCUPATIONAL THERAPIST

## 2017-04-21 ENCOUNTER — HOSPITAL ENCOUNTER (OUTPATIENT)
Dept: CARDIAC REHAB | Facility: CLINIC | Age: 72
End: 2017-04-21
Attending: PHYSICIAN ASSISTANT
Payer: MEDICARE

## 2017-04-21 PROCEDURE — 40000116 ZZH STATISTIC OP CR VISIT: Performed by: REHABILITATION PRACTITIONER

## 2017-04-21 PROCEDURE — 93798 PHYS/QHP OP CAR RHAB W/ECG: CPT | Performed by: REHABILITATION PRACTITIONER

## 2017-04-24 ENCOUNTER — HOSPITAL ENCOUNTER (OUTPATIENT)
Dept: CARDIAC REHAB | Facility: CLINIC | Age: 72
End: 2017-04-24
Attending: PHYSICIAN ASSISTANT
Payer: MEDICARE

## 2017-04-24 PROCEDURE — 40000116 ZZH STATISTIC OP CR VISIT: Performed by: REHABILITATION PRACTITIONER

## 2017-04-24 PROCEDURE — 93798 PHYS/QHP OP CAR RHAB W/ECG: CPT | Performed by: REHABILITATION PRACTITIONER

## 2017-04-26 ENCOUNTER — HOSPITAL ENCOUNTER (OUTPATIENT)
Dept: CARDIAC REHAB | Facility: CLINIC | Age: 72
End: 2017-04-26
Attending: PHYSICIAN ASSISTANT
Payer: MEDICARE

## 2017-04-26 PROCEDURE — 40000116 ZZH STATISTIC OP CR VISIT: Performed by: REHABILITATION PRACTITIONER

## 2017-04-26 PROCEDURE — 93798 PHYS/QHP OP CAR RHAB W/ECG: CPT | Performed by: REHABILITATION PRACTITIONER

## 2017-04-28 ENCOUNTER — HOSPITAL ENCOUNTER (OUTPATIENT)
Dept: CARDIAC REHAB | Facility: CLINIC | Age: 72
End: 2017-04-28
Attending: PHYSICIAN ASSISTANT
Payer: MEDICARE

## 2017-04-28 PROCEDURE — 93798 PHYS/QHP OP CAR RHAB W/ECG: CPT

## 2017-04-28 PROCEDURE — 40000116 ZZH STATISTIC OP CR VISIT

## 2017-05-01 ENCOUNTER — HOSPITAL ENCOUNTER (OUTPATIENT)
Dept: CARDIAC REHAB | Facility: CLINIC | Age: 72
End: 2017-05-01
Attending: PHYSICIAN ASSISTANT
Payer: MEDICARE

## 2017-05-01 PROCEDURE — 93798 PHYS/QHP OP CAR RHAB W/ECG: CPT | Performed by: OCCUPATIONAL THERAPIST

## 2017-05-01 PROCEDURE — 40000116 ZZH STATISTIC OP CR VISIT: Performed by: OCCUPATIONAL THERAPIST

## 2017-05-01 PROCEDURE — 93797 PHYS/QHP OP CAR RHAB WO ECG: CPT | Performed by: OCCUPATIONAL THERAPIST

## 2017-05-01 PROCEDURE — 40000575 ZZH STATISTIC OP CARDIAC VISIT #2: Performed by: OCCUPATIONAL THERAPIST

## 2017-05-04 ENCOUNTER — HOSPITAL ENCOUNTER (OUTPATIENT)
Dept: CARDIAC REHAB | Facility: CLINIC | Age: 72
End: 2017-05-04
Attending: PHYSICIAN ASSISTANT
Payer: MEDICARE

## 2017-05-04 VITALS — BODY MASS INDEX: 28.13 KG/M2 | WEIGHT: 164.8 LBS | HEIGHT: 64 IN

## 2017-05-04 PROCEDURE — 93798 PHYS/QHP OP CAR RHAB W/ECG: CPT | Performed by: OCCUPATIONAL THERAPIST

## 2017-05-04 PROCEDURE — 40000116 ZZH STATISTIC OP CR VISIT: Performed by: OCCUPATIONAL THERAPIST

## 2017-05-04 ASSESSMENT — 6 MINUTE WALK TEST (6MWT)
TOTAL DISTANCE WALKED (FT): 505
FEMALE CALC: 1385.42
GENDER SELECTION: FEMALE
PREDICTED: 1414.4
TOTAL DISTANCE WALKED (FT): 504
MALE CALC: 1405.83

## 2017-05-04 NOTE — PROGRESS NOTES
05/04/17 1000   Session   Session Discharge Note   Certified through this date 06/10/17   Physician cosignature/electronic signature indicates agreements with the ITP document and approval of discharge.   Discharge Summary: PT is walking with a cane. She is now exercising at the facility where she lives. She attended many of the classes including Nutrition. She is making more healthy choices. Her mobility has improved to the point she is able to climb stairs without fatigue or SOB. PT is planning to continue to exercise at home. Her BP has been higher some days in the 140-150/54-60 range at rest. PT plans to continue to monitor this. All questions were answered and she verbalized understanding of the information.    Cardiac Rehab Assessment   Cardiac Rehab Assessment Pt is deconditioned and has not been exercising due to fear that something will happen. She would like to gain confidence in order to help her start exercising at her gym in her apartment conplex at least 2x a week. Pt's BMI is 27 but she is not interested in losing weight as she has already lost 40lbs in the last year. She has arthritis; hip and knee replacement on the right side.  Pt is on Metformin but does not check her BG levels at home. She is currently using a cane for confidence as she does not have very good balance right now. 3/20/17 Progress update done today. PT has been making gradual progress with rehab. She has returned to most ADL's. She is having some anxiety, and has made an appointment with her primary MD at the beginning of April. She plans to attend most of the education classes and is looking forward to learning more about heart healthy eating. She has been using the Nustep at her place of residence. They have a workout room there. Skilled therapy is needed for education on nutrition and stress management, behavioral counseling in exercise adherance as well as continued monitored exercise in order to gain confidence to return to  ADLs.  3/24/17. 1:1 consult today.  Reviewed risk factor report. Overall PT is feeling well  She is progressing per POC. She has made several diet changes for healthy eating and diabetes control. She is starting to exercise at home.  SHe is hoping to be able to independent of her walker by completion of OPCR.  Recommended PT discuss with MD her A1c, EF, consult with diabetes educator and consulting with endocrinilogist for diabetes management.  Continue with skilled therapy for increased exercise tolerance, risk factor education and behavior change counsling on diet and exercise. 4/17/2017 PT is slowly progressing with the exercise levels. She has attended several of the education classes and is making changes to her diet. Her A1c is now 5.7. PT is feeling more confident with her exercise program and enjoys the support she receives in OPCR. PT would benefit from skilled therapy for support/counseling with the lifestyle changes with diet and exercise to reduce her CV risk.   General Information   Treatment Diagnosis Coronary Artery Bypass Surgery   Date of Treatment Diagnosis 01/01/17   Significant Past CV History None   Comorbidities DM   Lead up symptoms Couldn't breathe, twinge in chest   Hospital Location [facility]   Hospital Discharge Date 01/07/17   Signs and Symptoms Post Hospital Discharge Anxiety  (Appetite is improved)   Outpatient Cardiac Rehab Start Date 03/02/17   Primary Physician Ryanne Gonzáles    Primary Physician Follow Up Completed  (she has another appointment scheduled in April)   Surgeon Dr Placido Rodriguez    Surgeon Follow Up [facility]   Cardiologist Dr. Chen    Cardiologist Follow Up Completed   Ejection Fraction <35%    Risk Stratification High   Summary of Cath Report   Summary of Cath Report Available   Date Performed 01/09/17   Left Main 90% stenosis   LAD 80% ostial stenosis   RCA 80% stenosis   Living and Work Status    Living Arrangements and Social Status independent living facility  "  Support System Live alone   Return to Employment Retired   Occupation Transit  for [employer]   Preventative Medications   CMS recommended medications Beta Blocker;Antiplatelets;Lipid Lowering;Influenza vaccination;Pneumonia vaccination   Falls Screen   Have you fallen two or more times in the past year? No   Have you fallen and had an injury in the past year? No   Referral Initiated to Physical Therapy Patient recently attended Physical Therapy   Pain   Patient Currently in Pain No   Physical Assessments   Incisions WNL   Edema +3 Moderate   Right Lung Sounds not assessed   Left Lung Sounds not assessed   Limitations Orthopedic;Arthritis  (knee and hip replacement on right side)   Comments Pt had a hip and knee replacement on the right side but has not had any pain since the [facility].    Individualized Treatment Plan   Monitored Sessions Scheduled 24   Monitored Sessions Attended 21   Oxygen   Supplemental Oxygen needed No   Nutrition Management - Weight Management   Assessment Discharge   Age 71   Weight 74.8 kg (164 lb 12.8 oz)   Height 1.626 m (5' 4.02\")   BMI (Calculated) 28.33   Initial Rate Your Plate Score. Dietary tool to assess eating patterns. Scores range from 24 to 72. The higher the score the healthier the eating pattern. 50   Discharge Rate Your Plate Score 62   Weight Management Comments PT weighs herself every day and is pretty happy with her weight right now.  Pt does not want to go below 150# as she has already lost 40# in the last year. 4/17/2017 PT is hoping to maintain her weight at the current level as she does not want to regain the weight she has lost. Her weight is up slightly today, but she attributes it to overindulging easter weekend. 5/4/2017 Weight is up 4# since starting rehab.   Nutrition Management - Lipids   Lipids Labs Available   Date 03/10/17   Total Cholesterol 140   Triglycerides 140   HDL 29   LDL 83   Prescribed Lipid Medication Yes   Statin Intensity High " Intensity   Lipid Comments Pt will bring in a list of her lipid panel  3/24/17.  Reviewed values and recommneded outcome goals.   Nutrition Management - Diabetes   Diabetes Type II   Do you Monitor BS at Home? No   Diabetes Medication Prescribed Yes, Oral Medication   Hb A1C Date: 04/12/17   Hb A1C Result: 5.7   Diabetes Comments Pt will have A1C in April and will bring that report in. 4/17/2017 New A1c indicates her diabetes is well-controlled.   Nutrition Management Summary   Dietary Recommendations Low Sodium;Low Fat  (Doesnt remember )   Stages of Change for Diet Compliance Preparation   Interventions Planned Attend Nutrition Education Class(es);Educate on Benefits of Exercise;Instruct on Label Reading;Refer for Individual Diet Consultation   Interventions In Progress or Completed Attended Nutrition Class(es);Understands how to accurately read Food Labels   Patient Goals Goal #1   Goal #1 Description Pt will attend nutrition 1 and 2 in order to learn more about how to cook low sodium and fat diet for 1 person.    Goal #1 Target Date 04/19/17   Goal #1 Progress Towards Goal 3/20/17 PT will attend nutrition classes as offered throughout the rehab. She has been looking on the computer for heart healthy recipes. She eats out about 1 time per week.  3/24/17Planning to attend nutrition education classes.  She has made sevarl diet changes including no white bread or pastiries, increased fruit and fish, decreased sodium. 4/17/2017 PT has attended the nutrition 2 class and plans to attend Nutrition 1 this week. She is reading labels and not adding any salt to food. She has a handout on sodium on her cupboard that she refers to to help keep her on track. Given handout on spices to use instead of salt for flavoring food.   Nutrition Summary Comments Pt lives by herself and finds it difficult to maintain a low sodium diet with all of the prepackaged foods. She would like to learn more about how to lower sodium and fat and  what foods she is allowed to eat.    Nutrition Target Outcome Hb A1C < 7.0;Total Chol < 150, HDL > 40 (M), HDL > 50 (W), LDL < 70, Trig < 150;BMI < 25   Psychosocial Management   Psychosocial Assessment Discharge   Is there history of clinical depression or increased risk of depression? No previous history   Current Level of Stress per Patient Report Mild   Current Coping Skills Uses Stress Management/Relaxation Techniques   Initial Patient Health Questionnaire -9 Score (PHQ-9) for depression. 5-9 Minimal symptoms, 10-14 Minor depression, 15-19 Major depression, moderately severe, > 20 Major depression, severe  0   Discharge PHQ-9 Score for Depression 0   [facility].  Quality of Life:   If total score > 25 review individual areas where patient rated a 4 or 5.  Consider patients current medical condition and what role that plays on the score.   Adjust treatment protocol to improve areas of concern.  Consider the following:  PHQ9 score, DASI, and re-assessment within the next 30 days to assist with developing treatments.  16   Discharge [facility] COOP Survey Score 14   Stages of Change Contemplation   Interventions Planned Patient to verbalize understanding of behavioral assessment results;Patient to verbalize understanding of negative impact of stress to personal health;Patient to attend stress management class(es);Patient interested in implementing one strategy to reduce current level of stress/anxiety;Patient will practice coping/stress management techniques;Provide resources for stress relaxation   Interventions In Progress or Completed Patient verbalizes understanding of negative impact of stress to personal health;Patient attended stress management class(es);Patient is currently practicing coping/relaxation techniques;Patient was given resources for stress relaxation;Pt recognizes signs and symptoms of depression;Patient verbalizes understanding of behavioral assessment scores   Patient  Goal Yes   Goal Description Pt will attend the stress management class to learn techniques on how to manage stress    Goal Target Date 04/26/17   Goal Date Met 05/04/17   Progress Towards Goal 3/20/17 PT will attend classes. She has been waking up with some anxiety regarding her diagnosis. She will follow up with her primary MD.  3/24/17.  She did not mention any anxiety today.  She will attend class on 3/27/17. 5/4/2017 PT attended the stress management class. She does not feel that stress is an issue for her right now.   Psychosocial Comments Pt has never dealt with depression and doesnt think it is or will be a problem. She does have some stress from her health and great neices and nephews and would like to find some more techniques for managing stress. PT is also planning to start chair yoga with is offered twice a month where she resides.   Psychosocial Target Outcome Maximize coping skills   Other Core Components - Hypertension   History of or Diagnosis of Hypertension Yes   Currently taking Anti-Hypertensives Yes;Beta blocker;Ace Inhibitor   Hypertension Comments 3/24/17.  BP well controlled. 4/17/2017 BP has been higher some sessions. Will continue to monitor. 5/4/2017 /54 at rest today. She is on Lisinopril/HCTZ. She plans to continue to monitor her BP where she lives.   Other Core Components - Tobacco   History of Tobacco Use Yes   Quit Date or Planned Quit Date (1997)   Tobacco Use Status Former (Quit > 6 mo ago)   Tobacco Habit Cigarettes   Tobacco Use per Day (average) 1   Years of Tobacco Use 30    Stages of Change Maintenance   Other Core Components Summary   Interventions Planned Attend education class on Blood Pressure;List benefits of weight management;Educate on importance of maintaining low sodium diet;Educate on the use of 'Stop Light' tool;Educate on importance of monitoring daily weight;Attend education class(es) on Nutrition;Instruct patient on the DASH diet   Interventions In Progress  or Completed Instructed on DASH diet;Educated on importance of maintaining low sodium diet   Other Core Components Comments 4/17/2017 Given written information on the DASH diet. PT is watching her salt intake and weight. 5/4/2017 Showed PT how to monitor the fluid retention her her legs. She does have swelling in her lower legs. She has compression hose she can wear. Recommended she elevate her feet during the day.   Activity/Exercise History   Activity/Exercise Assessment Re-assessment   Activity/Exercise Status prior to event? Sedentary   Number of Days Currently participating in Moderate Physical Activity? 0   Number of Days Currently performing  Aerobic Exercise (including rehab)? 4-5  (plus 20 minutes on days off from rehab)   Number of Minutes per Session Currently of Aerobic Exercise (average)? 30-35   Current Stage of Change (Physical Activity) Preparation   Current Stage of Change (Aerobic Exercise) Preparation   Patient Goals Goal #1   Goal #1 Description Pt will attend rehab 2-3 x weeks and will exercise 1-2x a week at her home gym.    Goal #1 Target Date 03/23/17   Goal #1 Date Met 04/17/17   Goal #1 Progress Towards Goal Pt has access to TM and NuStep at her gym in her apartment complex 3/20/17 PT has been using her Nustep at home for 20 minutes on days off from rehab.  3/24/17.  Attending rehab regularly. She is just starting to exercise outside of rehab (1 time to date). 4/17/2017 PT is going to her wellness room to exercise where she lives 2 days per week for 30-35 minutes of exercise. She does the nustep and sometimes the TM for a short amount of time. Goal Met.   Activity/Exercise Comments Pt was sedentary pre-op and would like to gain confidence in order to exercise on her own. Currently, she is very timid about doing anything activity related without being monitored.    Activity/Exercise Target Outcome An Accumulation of 150  Minutes of Aerobic Activity per Week   Exercise Assessment   6 Minute  Walk Predicted - Gender Selection Female   6 Minute Walk Predicted (Male) 1405.83   6 Minute Walk Predicted (Female) 1385.42   Initial 6 Minute Walk Distance (Feet) 505 ft   Discharge 6 Minute Walk Distance (Feet) 504   Resting HR 60 bpm   Exercise HR 82 bpm   Post Exercise HR 57 bpm   Resting /54   Exercise /56   Post Exercise /56   Pre  mg/dL   Effort Rating 6   Current MET Level 3.0   MET Level Goal 3-3.5   ECG Rhythm Normal sinus rhythm   Ectopy None   Current Symptoms Denies symptoms   Limitations/Restrictions Orthopedic (see comments)  (Knee and hip replacement )   Exercise Prescription   Mode Nustep;Treadmill;Weights;Arm Ergometer   Duration/Time 30-45 min   Frequency 3 daysweek   THR (85% of age predicted max HR) 125.8   OMNI Effort Rating (0-10 Scale) 4-6/10   Progression Aerobic exercise to OMNI rating of 6 or below and at or below THR;Progress peak intensity by 1/4 MET per week;Total exercise time of 30-45 minutes;Continuous bouts   Comments Pt has access to NuStep at home gym but has never tried the TM before. May need some assistance for the first session or two.    Recommended Home Exercise   Type of Exercise Walking;Other (comments);Treadmill  (NuStep )   Frequency (days per week) 3-4   Duration (minutes per session) 30-45 min   Effort Rating Recommended 4-6/10   30 Day Exercise Plan Pt will work out at rehab 2-3x a week and 1-2 x a week for 20minutes at her gym at home. 5/4/2017 To increase her exercise time to 30 minutes per session with a rest break.   Current Home Exercise   Type of Exercise Walking;Treadmill;Other (comments)  (Nustep)   Frequency (days per week) 2   Duration (minutes per session) 30-35   Follow-up/On-going Support   Provider follow-up needed on the following Blood Pressure   Comments /60 today at rest and has been higher some sessions. 5/4/2017 /54 today. Please followup [facility] PT.   Learning Assessment   Learner Patient   Primary Language English    Preferred Learning Style (hands on )   Barriers to Learning No barriers noted   Patient Education   Education recommended Anatomy and Physiology of the Heart;Blood Pressure;Exercise Principles;Medication Overview;Nutrition;Stress Management   Education classes attended Stress Management;Muscle Conditioning;Nutrition;Anatomy and Physiology of the Heart;Blood Pressure;Medication Overview

## 2018-12-09 ENCOUNTER — HOSPITAL ENCOUNTER (OUTPATIENT)
Facility: CLINIC | Age: 73
Setting detail: OBSERVATION
Discharge: HOME | End: 2018-12-10
Attending: EMERGENCY MEDICINE | Admitting: INTERNAL MEDICINE
Payer: MEDICARE

## 2018-12-09 ENCOUNTER — HOSPITAL ENCOUNTER (EMERGENCY)
Facility: CLINIC | Age: 73
Discharge: HOME | End: 2018-12-09
Admitting: EMERGENCY MEDICINE
Payer: MEDICARE

## 2018-12-09 DIAGNOSIS — R04.0 EPISTAXIS: ICD-10-CM

## 2018-12-09 LAB
BASOPHILS # BLD AUTO: 0 10E9/L (ref 0–0.2)
BASOPHILS NFR BLD AUTO: 0.4 %
CREAT SERPL-MCNC: 0.84 MG/DL (ref 0.52–1.04)
DIFFERENTIAL METHOD BLD: ABNORMAL
EOSINOPHIL # BLD AUTO: 0.4 10E9/L (ref 0–0.7)
EOSINOPHIL NFR BLD AUTO: 4.7 %
ERYTHROCYTE [DISTWIDTH] IN BLOOD BY AUTOMATED COUNT: 14 % (ref 10–15)
GFR SERPL CREATININE-BSD FRML MDRD: 66 ML/MIN/1.7M2
GLUCOSE BLDC GLUCOMTR-MCNC: 128 MG/DL (ref 70–99)
HCT VFR BLD AUTO: 36.5 % (ref 35–47)
HGB BLD-MCNC: 11.5 G/DL (ref 11.7–15.7)
HGB BLD-MCNC: 11.6 G/DL (ref 11.7–15.7)
IMM GRANULOCYTES # BLD: 0 10E9/L (ref 0–0.4)
IMM GRANULOCYTES NFR BLD: 0.3 %
INR PPP: 1 (ref 0.86–1.14)
LYMPHOCYTES # BLD AUTO: 1.5 10E9/L (ref 0.8–5.3)
LYMPHOCYTES NFR BLD AUTO: 16.2 %
MCH RBC QN AUTO: 27.9 PG (ref 26.5–33)
MCHC RBC AUTO-ENTMCNC: 31.8 G/DL (ref 31.5–36.5)
MCV RBC AUTO: 88 FL (ref 78–100)
MONOCYTES # BLD AUTO: 0.7 10E9/L (ref 0–1.3)
MONOCYTES NFR BLD AUTO: 7.2 %
NEUTROPHILS # BLD AUTO: 6.5 10E9/L (ref 1.6–8.3)
NEUTROPHILS NFR BLD AUTO: 71.2 %
NRBC # BLD AUTO: 0 10*3/UL
NRBC BLD AUTO-RTO: 0 /100
PLATELET # BLD AUTO: 112 10E9/L (ref 150–450)
RBC # BLD AUTO: 4.16 10E12/L (ref 3.8–5.2)
WBC # BLD AUTO: 9.2 10E9/L (ref 4–11)

## 2018-12-09 PROCEDURE — 30903 CONTROL OF NOSEBLEED: CPT | Mod: 50

## 2018-12-09 PROCEDURE — A9270 NON-COVERED ITEM OR SERVICE: HCPCS | Mod: GY | Performed by: INTERNAL MEDICINE

## 2018-12-09 PROCEDURE — 82565 ASSAY OF CREATININE: CPT | Performed by: EMERGENCY MEDICINE

## 2018-12-09 PROCEDURE — 99283 EMERGENCY DEPT VISIT LOW MDM: CPT | Mod: 25,27

## 2018-12-09 PROCEDURE — 36415 COLL VENOUS BLD VENIPUNCTURE: CPT | Performed by: INTERNAL MEDICINE

## 2018-12-09 PROCEDURE — 30901 CONTROL OF NOSEBLEED: CPT | Mod: LT

## 2018-12-09 PROCEDURE — 00000146 ZZHCL STATISTIC GLUCOSE BY METER IP

## 2018-12-09 PROCEDURE — 85018 HEMOGLOBIN: CPT | Performed by: INTERNAL MEDICINE

## 2018-12-09 PROCEDURE — 99285 EMERGENCY DEPT VISIT HI MDM: CPT | Mod: 25

## 2018-12-09 PROCEDURE — 25000132 ZZH RX MED GY IP 250 OP 250 PS 637: Mod: GY | Performed by: HOSPITALIST

## 2018-12-09 PROCEDURE — 85025 COMPLETE CBC W/AUTO DIFF WBC: CPT | Performed by: EMERGENCY MEDICINE

## 2018-12-09 PROCEDURE — 85610 PROTHROMBIN TIME: CPT | Performed by: EMERGENCY MEDICINE

## 2018-12-09 PROCEDURE — 25000132 ZZH RX MED GY IP 250 OP 250 PS 637: Mod: GY | Performed by: INTERNAL MEDICINE

## 2018-12-09 PROCEDURE — G0378 HOSPITAL OBSERVATION PER HR: HCPCS

## 2018-12-09 PROCEDURE — 99219 ZZC INITIAL OBSERVATION CARE,LEVL II: CPT | Performed by: INTERNAL MEDICINE

## 2018-12-09 RX ORDER — FUROSEMIDE 20 MG
10 TABLET ORAL
Status: ON HOLD | COMMUNITY
End: 2021-05-05

## 2018-12-09 RX ORDER — LISINOPRIL/HYDROCHLOROTHIAZIDE 10-12.5 MG
TABLET ORAL
COMMUNITY
Start: 2018-10-31 | End: 2018-12-09

## 2018-12-09 RX ORDER — ALBUTEROL SULFATE 90 UG/1
1-2 AEROSOL, METERED RESPIRATORY (INHALATION) EVERY 4 HOURS PRN
COMMUNITY

## 2018-12-09 RX ORDER — OLOPATADINE HYDROCHLORIDE 1 MG/ML
1 SOLUTION/ DROPS OPHTHALMIC 2 TIMES DAILY
COMMUNITY

## 2018-12-09 RX ORDER — FEXOFENADINE HCL 60 MG/1
60 TABLET, FILM COATED ORAL 2 TIMES DAILY PRN
Status: ON HOLD | COMMUNITY
End: 2021-05-05

## 2018-12-09 RX ORDER — ASPIRIN 81 MG/1
81 TABLET ORAL AT BEDTIME
COMMUNITY

## 2018-12-09 RX ORDER — LIDOCAINE HYDROCHLORIDE AND EPINEPHRINE 10; 10 MG/ML; UG/ML
INJECTION, SOLUTION INFILTRATION; PERINEURAL
Status: DISCONTINUED
Start: 2018-12-09 | End: 2018-12-09 | Stop reason: HOSPADM

## 2018-12-09 RX ORDER — ACETAMINOPHEN 325 MG/1
325-650 TABLET ORAL EVERY 4 HOURS PRN
Status: DISCONTINUED | OUTPATIENT
Start: 2018-12-09 | End: 2018-12-10 | Stop reason: HOSPADM

## 2018-12-09 RX ORDER — AMLODIPINE BESYLATE 2.5 MG/1
2.5 TABLET ORAL DAILY
COMMUNITY

## 2018-12-09 RX ORDER — NALOXONE HYDROCHLORIDE 0.4 MG/ML
.1-.4 INJECTION, SOLUTION INTRAMUSCULAR; INTRAVENOUS; SUBCUTANEOUS
Status: DISCONTINUED | OUTPATIENT
Start: 2018-12-09 | End: 2018-12-10 | Stop reason: HOSPADM

## 2018-12-09 RX ORDER — OXYMETAZOLINE HYDROCHLORIDE 0.05 G/100ML
SPRAY NASAL
Status: DISCONTINUED
Start: 2018-12-09 | End: 2018-12-09 | Stop reason: HOSPADM

## 2018-12-09 RX ORDER — ATORVASTATIN CALCIUM 80 MG/1
80 TABLET, FILM COATED ORAL AT BEDTIME
COMMUNITY

## 2018-12-09 RX ORDER — AMOXICILLIN 500 MG/1
CAPSULE ORAL
COMMUNITY
Start: 2018-08-30 | End: 2018-12-09

## 2018-12-09 RX ORDER — CHOLECALCIFEROL (VITAMIN D3) 50 MCG
1 TABLET ORAL EVERY MORNING
COMMUNITY

## 2018-12-09 RX ORDER — FERROUS SULFATE 325(65) MG
325 TABLET ORAL
COMMUNITY
Start: 2017-04-12 | End: 2018-12-09

## 2018-12-09 RX ORDER — CEPHALEXIN 250 MG/5ML
500 POWDER, FOR SUSPENSION ORAL 4 TIMES DAILY
Status: DISCONTINUED | OUTPATIENT
Start: 2018-12-09 | End: 2018-12-10 | Stop reason: HOSPADM

## 2018-12-09 RX ORDER — ACETAMINOPHEN 325 MG/1
325-650 TABLET ORAL
COMMUNITY
Start: 2018-04-06 | End: 2018-12-09

## 2018-12-09 RX ORDER — METOPROLOL SUCCINATE 100 MG/1
100 TABLET, EXTENDED RELEASE ORAL DAILY
Status: DISCONTINUED | OUTPATIENT
Start: 2018-12-09 | End: 2018-12-10 | Stop reason: HOSPADM

## 2018-12-09 RX ORDER — AMOXICILLIN 250 MG
1 CAPSULE ORAL 2 TIMES DAILY PRN
Status: DISCONTINUED | OUTPATIENT
Start: 2018-12-09 | End: 2018-12-10 | Stop reason: HOSPADM

## 2018-12-09 RX ORDER — ALBUTEROL SULFATE 90 UG/1
1-2 AEROSOL, METERED RESPIRATORY (INHALATION)
COMMUNITY
Start: 2018-06-13 | End: 2018-12-09

## 2018-12-09 RX ORDER — AZELASTINE 1 MG/ML
1 SPRAY, METERED NASAL EVERY MORNING
Status: ON HOLD | COMMUNITY
End: 2018-12-10

## 2018-12-09 RX ORDER — METOPROLOL SUCCINATE 100 MG/1
100 TABLET, EXTENDED RELEASE ORAL DAILY
COMMUNITY

## 2018-12-09 RX ORDER — ONDANSETRON 2 MG/ML
4 INJECTION INTRAMUSCULAR; INTRAVENOUS EVERY 6 HOURS PRN
Status: DISCONTINUED | OUTPATIENT
Start: 2018-12-09 | End: 2018-12-10 | Stop reason: HOSPADM

## 2018-12-09 RX ORDER — AMLODIPINE BESYLATE 2.5 MG/1
2.5 TABLET ORAL DAILY
COMMUNITY
End: 2018-12-09

## 2018-12-09 RX ORDER — AMLODIPINE BESYLATE 2.5 MG/1
2.5 TABLET ORAL DAILY
Status: DISCONTINUED | OUTPATIENT
Start: 2018-12-09 | End: 2018-12-10 | Stop reason: HOSPADM

## 2018-12-09 RX ORDER — FERROUS SULFATE 325(65) MG
325 TABLET ORAL
Status: ON HOLD | COMMUNITY
End: 2021-05-05

## 2018-12-09 RX ORDER — AMOXICILLIN 250 MG
2 CAPSULE ORAL 2 TIMES DAILY PRN
Status: DISCONTINUED | OUTPATIENT
Start: 2018-12-09 | End: 2018-12-10 | Stop reason: HOSPADM

## 2018-12-09 RX ORDER — OLOPATADINE HYDROCHLORIDE 1 MG/ML
1 SOLUTION/ DROPS OPHTHALMIC DAILY PRN
Status: DISCONTINUED | OUTPATIENT
Start: 2018-12-09 | End: 2018-12-10 | Stop reason: HOSPADM

## 2018-12-09 RX ORDER — TRAZODONE HYDROCHLORIDE 50 MG/1
50 TABLET, FILM COATED ORAL
COMMUNITY
Start: 2017-08-03 | End: 2018-12-09

## 2018-12-09 RX ORDER — ATORVASTATIN CALCIUM 80 MG/1
80 TABLET, FILM COATED ORAL
COMMUNITY
Start: 2017-08-03 | End: 2018-12-09

## 2018-12-09 RX ORDER — LISINOPRIL/HYDROCHLOROTHIAZIDE 10-12.5 MG
1 TABLET ORAL DAILY
Status: DISCONTINUED | OUTPATIENT
Start: 2018-12-09 | End: 2018-12-10 | Stop reason: HOSPADM

## 2018-12-09 RX ORDER — ACETAMINOPHEN 325 MG/1
325-650 TABLET ORAL EVERY 4 HOURS PRN
Status: ON HOLD | COMMUNITY
End: 2021-05-05

## 2018-12-09 RX ORDER — ACETAMINOPHEN 325 MG/1
650 TABLET ORAL EVERY 4 HOURS PRN
Status: DISCONTINUED | OUTPATIENT
Start: 2018-12-09 | End: 2018-12-09

## 2018-12-09 RX ORDER — FEXOFENADINE HCL 60 MG/1
60 TABLET, FILM COATED ORAL
COMMUNITY
Start: 2017-01-07 | End: 2018-12-09

## 2018-12-09 RX ORDER — POLYETHYLENE GLYCOL 3350 17 G/17G
17 POWDER, FOR SOLUTION ORAL DAILY PRN
Status: DISCONTINUED | OUTPATIENT
Start: 2018-12-09 | End: 2018-12-10 | Stop reason: HOSPADM

## 2018-12-09 RX ORDER — ACETAMINOPHEN 650 MG/1
650 SUPPOSITORY RECTAL EVERY 4 HOURS PRN
Status: DISCONTINUED | OUTPATIENT
Start: 2018-12-09 | End: 2018-12-10 | Stop reason: HOSPADM

## 2018-12-09 RX ORDER — OLOPATADINE HYDROCHLORIDE 1 MG/ML
1 SOLUTION/ DROPS OPHTHALMIC
COMMUNITY
Start: 2017-01-07 | End: 2018-12-09

## 2018-12-09 RX ORDER — LISINOPRIL/HYDROCHLOROTHIAZIDE 10-12.5 MG
1 TABLET ORAL DAILY
COMMUNITY

## 2018-12-09 RX ORDER — FUROSEMIDE 20 MG
20 TABLET ORAL
COMMUNITY
Start: 2017-11-02 | End: 2018-12-09

## 2018-12-09 RX ORDER — METOPROLOL SUCCINATE 100 MG/1
TABLET, EXTENDED RELEASE ORAL
COMMUNITY
Start: 2018-10-31 | End: 2018-12-09

## 2018-12-09 RX ORDER — FLUTICASONE PROPIONATE 50 MCG
2 SPRAY, SUSPENSION (ML) NASAL
COMMUNITY
Start: 2017-06-21 | End: 2018-12-09

## 2018-12-09 RX ORDER — AMOXICILLIN 500 MG/1
2000 CAPSULE ORAL PRN
COMMUNITY

## 2018-12-09 RX ORDER — ONDANSETRON 4 MG/1
4 TABLET, ORALLY DISINTEGRATING ORAL EVERY 6 HOURS PRN
Status: DISCONTINUED | OUTPATIENT
Start: 2018-12-09 | End: 2018-12-10 | Stop reason: HOSPADM

## 2018-12-09 RX ADMIN — CEPHALEXIN 500 MG: 250 POWDER, FOR SUSPENSION ORAL at 17:02

## 2018-12-09 RX ADMIN — METOPROLOL SUCCINATE 100 MG: 100 TABLET, EXTENDED RELEASE ORAL at 12:17

## 2018-12-09 RX ADMIN — LISINOPRIL AND HYDROCHLOROTHIAZIDE 1 TABLET: 12.5; 1 TABLET ORAL at 12:17

## 2018-12-09 RX ADMIN — AMLODIPINE BESYLATE 2.5 MG: 2.5 TABLET ORAL at 12:17

## 2018-12-09 RX ADMIN — Medication 1 MG: at 20:11

## 2018-12-09 RX ADMIN — CEPHALEXIN 500 MG: 250 POWDER, FOR SUSPENSION ORAL at 20:11

## 2018-12-09 RX ADMIN — ACETAMINOPHEN 650 MG: 325 TABLET, FILM COATED ORAL at 21:24

## 2018-12-09 RX ADMIN — Medication: at 23:01

## 2018-12-09 SDOH — HEALTH STABILITY: MENTAL HEALTH: HOW OFTEN DO YOU HAVE A DRINK CONTAINING ALCOHOL?: NEVER

## 2018-12-09 ASSESSMENT — MIFFLIN-ST. JEOR
SCORE: 1084.67
SCORE: 1190.81

## 2018-12-09 ASSESSMENT — ENCOUNTER SYMPTOMS: DIZZINESS: 0

## 2018-12-09 NOTE — ED PROVIDER NOTES
History     Chief Complaint:  Epistaxis      HPI   Heather Martin is a 72 year old female with a history of hypertension, coronary artery disease, type II diabetes, and paroxsymal atrial fibrillation, who presents to the emergency department today via EMS for evaluation of epistaxis. Per chart review, patient was in the emergency department last night, around 2300, for a nose bleed. Her left nare was cauterized and she was discharged with Afrin spray. This morning, she sneezed, and her left nare began bleeding again. She was not able to use her Afrin spray. EMS say she has had a clamp on for roughly 15 minutes. She denies dizziness and takes no blood thinners besides Aspirin.     Allergies:  No Known Drug Allergies      Medications:    Aspirin   Norvasc   Toprol  Lisinopril-hydrochlorothiazide   Lipitor  Metformin  Lasix  Desyrel     Past Medical History:    Coronary artery disease  Hyperlipidemia  Diabetes mellitus type 2  Hypertension   Paroxysmal atrial fibrillation   Degenerative joint disease     Past Surgical History:    Coronary artery bypass graft   Total right knee arthroplasty   Right total hip arthroplasty  Cholecystectomy   Appendectomy  Tonsillectomy     Family History:    Father: Stroke  Mother: Lymphoma, hypertension  Brother: Heart disease, hypertension, renal failure  Sister: Alcohol abuse, dementia   No family history of breast, colon, endometrial, or ovarian cancer.     Social History:  Smoking Status: Former Smoker   Packs/day: 1   Years: 40   Types: Cigarettes   Smokeless Tobacco: Former User  Alcohol Use: Negative    Marital Status: Single     Review of Systems   HENT: Positive for nosebleeds.    Neurological: Negative for dizziness.   All other systems reviewed and are negative.    Physical Exam     Patient Vitals for the past 24 hrs:   BP Heart Rate Resp SpO2 Height Weight   12/09/18 0903 (!) 173/96 -- -- -- -- --   12/09/18 0902 (!) 173/96 103 18 91 % -- --   12/09/18 0853 -- -- -- -- 1.6  "m (5' 3\") 71.7 kg (158 lb)     Physical Exam  General: Patient is alert and interactive when I enter the room  Head:  The scalp, face, and head appear normal  Eyes:  Conjunctivae are normal  ENT:    The nose is normal    Pinnae are normal    External acoustic canals are normal    Hemorrhage from nares bilaterally, left nare with cautery intact, bleeding coming from posterior nare    Clot and active bleeding in posterior pharynx    Airway intact   Neck:  Trachea midline  CV:  Pulses are normal    Resp:  No respiratory distress   Abdomen:      Soft, non-tender, non-distended  Musc:  Normal muscular tone    No major joint effusions    No asymmetric leg swelling  Skin:  No rash or lesions noted  Neuro: Speech is normal and fluent. Face is symmetric.     Moving all extremities well.   Psych:  Awake. Alert.  Normal affect.  Appropriate interactions.    Emergency Department Course     Laboratory:  Laboratory findings were communicated with the patient who voiced understanding of the findings.    CBC: WBC 9.2, HGB 11.6(L), (L)  INR: 1.00    Procedures:     Rapid Rhino Nasal Packing     PHYSICIAN: Rita Nogueira MD     INDICATION:  Epistaxis    ANESTHESIA: Lidocaine 1% with epi     TECHNIQUE: 5 cm Rapid Rhino was inserted into the bilateral naris to provide pressure. The patient had adequate hemostasis after application of packing, and the patient was generally comfortable after the procedure. The patient tolerated the procedure well with no immediate complications.    Emergency Department Course:    0848 I performed an exam of the patient as documented above.     0852 Nursing notes and vitals reviewed.    0900 Rapid rhino nasal packing procedure performed as noted above.     0924 IV was inserted and blood was drawn for laboratory testing, results above.    0957 Recheck.     1011 Recheck.     1016 I spoke with Dr. Cruz of the ENT service from [facility] regarding patient's presentation, findings, and " plan of care.    1020 I spoke with Dr. Lafleur of the hospitalist service from [facility] regarding patient's presentation, findings, and plan of care.    1050 I personally reviewed the lab results with the patient and answered all related questions prior to admit.    Impression & Plan      Medical Decision Making:  Heather Martin is a 72 year old female not on anticoagulation who presents to the emergency department today for evaluation of nosebleed. She was previously seen here last night for a nose bleed and had cautery done. I can see the cautery where it was done and there is no hemorrhage from this site and it seems more posterior. She has a fair amount of clot, we evacuated this as well as in the posterior pharynx. She continued to bleed so rapid rhino was placed in her left nare. She continued to bleed at this point so put another rapid rhino in the right. The balloons were inflated. Her bleeding remained controlled. She had mild leakage around her nares, but much improvement from the posterior aspect of her pharynx bleeding. Her hemoglobin seems stable. Patient did not want to go home given her packing and her recurrent bleed. I think this is reasonable. Patient is not on blood thinners so no reversal needed. I discussed the case with ENT given occasional leakage and they stated just maintain the packing. Patient admitted to observation for epistaxis and she will also need antibiotic treatment. Patient admitted in stable condition.     Diagnosis:    ICD-10-CM    1. Epistaxis R04.0      Disposition:   The patient is admitted into the care of Dr. Lafleur.    Scribe Disclosure:  I, Iliana Trinidad, am serving as a scribe at 8:56 AM on 12/9/2018 to document services personally performed by Rita Nogueira MD based on my observations and the provider's statements to me.      [facility] EMERGENCY DEPARTMENT       Rita Nogueira MD  12/10/18 [id number]

## 2018-12-09 NOTE — PLAN OF CARE
"Patient has no change.     PRIMARY DIAGNOSIS: Epistaxis    OUTPATIENT/OBSERVATION GOALS TO BE MET BEFORE DISCHARGE:  1. ADLs back to baseline: Yes    2. Activity and level of assistance: Up with standby assistance.    3. Pain status: Pain free.    4. Return to near baseline physical activity: Yes     Discharge Planner Nurse   Safe discharge environment identified: Yes  Barriers to discharge: Yes-- Packing in place. Hgb recheck at 1400.         Entered by: Vernell Shook 12/09/2018 12:26 PM   /75   Temp 98  F (36.7  C) (Oral)   Resp 18   Ht 1.6 m (5' 3\")   Wt 61.1 kg (134 lb 9.6 oz)   SpO2 93%   BMI 23.84 kg/m    Pt alert and orientated. VSS. Packing in place. Bleeding still noted from bilateral nostrils. No pain, nausea. SBA for safety. Tolerating PO meds and diet. Will recheck hgb at 1400.   1:32 PM : Pt called for RN. Pt  Noted to have coughed up a large clot from her throat. Pt voiced relief. MD updated. Will continue to monitor.     Please review provider order for any additional goals.   Nurse to notify provider when observation goals have been met and patient is ready for discharge.  "

## 2018-12-09 NOTE — ED PROVIDER NOTES
Visit Date:   12/09/2018      CHIEF COMPLAINT:  Epistaxis.      HISTORY OF PRESENT ILLNESS:  This is a pleasant female who presents for evaluation of epistaxis.  She had bleeding from the left naris.  She is on aspirin, but not on any other blood thinners.  She has not had frequent epistaxis in the past.  She has had no trouble breathing or swallowing, but has had some blood down the back of her throat.  She is not nauseous currently.     ROS:  See HPI, all other systems are negative.      ALLERGIES:  None.      CURRENT MEDICATIONS:  Aspirin.      PAST MEDICAL HISTORY:  Multi-nodule goiter, carotid bruit.      PAST SURGICAL HISTORY:  None.      PHYSICAL EXAMINATION:   VITAL SIGNS:  Blood pressure is 181/69, pulse 79, respiratory rate 17, temperature 98.4, O2 sat 97% on room air.   GENERAL:  The patient is alert, interactive when I enter the room.   HEENT:  Head is atraumatic.  There is packing in the left naris that was put in by paramedics, consisting of 4x4 gauze.  This is removed.  She has bleeding from Kiesselbach's triangle and above.  The nose was clamped again.  Oropharyngeal exam shows blood in the posterior throat but this is a minor amount of bleeding.   NECK:  Supple without lymphadenopathy.   CARDIOVASCULAR:  Regular rate and rhythm, S1, S2, no murmurs.   RESPIRATORY:  Clear bilaterally, no wheezes, rhonchi or rales.   GASTROINTESTINAL:  No tenderness to palpation.   MUSCULOSKELETAL:  Back and neck show full range of motion of the extremities.   NEUROLOGIC:  Cranial nerves are grossly intact.  Speech is fluent, moving all extremities well.   PSYCHIATRIC:  No anxiety or agitation.   LYMPHATICS:  No neck lymphadenopathy.      LABORATORY AND DIAGNOSTICS:  None.        INTERVENTIONS     Procedure Note:  Cautery of the bleeding vessel in the left naris.   performed by:  Gerald Mcpherson MD   Consent:  Verbal.      DESCRIPTION OF PROCEDURE:  The left naris was prepped in the usual fashion.  Afrin was squirted  into the nose and the bleeding was noted to be almost stopped.  I used silver nitrate sticks to cauterize 2 different areas, though area on the upper septum did show a large amount of bleeding when I initially touched it with the silver nitrate stick but I was able to control this bleeding nicely.  There is no bleeding at the conclusion of the procedure.  She tolerated this well with no complications.      EMERGENCY DEPARTMENT COURSE AND MEDICAL DECISION MAKING:  This is a female with left-sided epistaxis, not on anticoagulation besides aspirin, who presents for evaluation of a moderate amount of bleeding from the left naris.  This was controlled with pressure and silver nitrate cautery and she is safe to go home without packing at this point.  She understands that she may need to return if the bleeding resumes.  I gave her instructions how to manage this at home.      DIAGNOSES:  Epistaxis.      Part two #[id number]  D: 2018 01:29 a.m.  T: 2018 02:40 a.m.  payton/magda LEMONS MD             D: 2018   T: 2018   MT: PAYTON      Name:     YEHUDA HERRMANN   MRN:      [id number]        Account:      [id number]   :      1945           Visit Date:   2018      Document: [id number]

## 2018-12-09 NOTE — H&P
[facility]  Hospitalist Admission Note  Name: Heather Martin    MRN: [id number]  YOB: 1945    Age: 72 year old  Date of admission: 12/9/2018  Primary care provider: No Ref-Primary, Physician    Chief Complaint:  Epistaxis    Assessment and Plan:     Heather Martin is a 72 year old female with PMH including CAD (prior CABG), HTN, DM (on oral agents) who was admitted 12/09/18 with recurrent epistaxis.    1. Epistaxis: Initially seen early this morning and had cauterization.  Recurred this morning and had significant blood loss.  Had bilateral packing in the ER and so far bleeding has stopped.  If bleeding does recur will need to consult ENT.  She is only on ASA 81 mg which I would continue given her prior history of CABG.  ER discussed with ENT, recommended prophylactic antibiotics and follow up with ENT by Wednesday for removal of packing.  - Continue bilateral nasal packing  - ENT consult should she have significant re bleed  - Keflex prophylaxis  - Follow up ENT on Wednesday in clinic for packing removal    2. ABLA: Hgb was 11.6 in the ER.  She had significant blood loss until epistaxis was stopped in the ER.  Will repeat Hgb this afternoon.    3. CAD: Prior CABG.  Continue PTA medications (ASA, statin, Amlodipine, Prinzide, Metoprolol).  No cardiopulmonary symptoms.    4. DM2: Continue PTA Metformin.    5. Thrombocytopenia: Platelets 112, looking at old labs she has been 60-80.  Recommend follow up in the outpatient setting to ensure improvement.    DVT Prophylaxis: Low Risk/Ambulatory with no VTE prophylaxis indicated  Code Status: Full Code  Discharge Dispo: Home  Estimated Disch Date / # of Days until Disch: Tomorrow if epistaxis does not recur      History of Present Illness:  Heather Martin is a 72 year old female with PMH including CAD (prior CABG), HTN, DM (on oral agents) who was admitted 12/09/18 with recurrent epistaxis.  History was obtained through patient interview,  chart review and discussion with Dr. Nogueira in the ER.    The patient was seen in the ER very early this morning for epistaxis on the left nare.  Bleeding was controlled with pressure and silver nitrate cautery and she was discharged home.      She presented again this morning to the ER because she sneezed and her left nare began to bleed again.  She was not able to use Afrin spray.  She had profuse bleeding and could not stop this so she called EMS.  EMS was called and she had a clamp on her nose for about 15 minutes.  She is not on any blood thinners other than ASA.      She has never had anything happen like this before.  She notes her nose is chronically dry.  She has not had CP, SOB, dizziness, lightheadedness.  She does feel worn out after being in the ER.  No nausea or emesis.    In the ER she had rather profuse bleeding.  She had several clots and it was estimated that there was 200 ml in the suction catheter before getting control of her bleeding.  ENT was contacted by the ER physician as she had bilateral nasal packing and continued to have slight drainage down the back of her throat.  They had no further recommendations unless she were to have significant bleeding again.     Past Medical History:  Past Medical History:   Diagnosis Date     Diabetes (H)      Hypertension      Thyroid disease      Past Surgical History:  Past Surgical History:   Procedure Laterality Date     CARDIAC SURGERY      bypass     CHOLECYSTECTOMY       EYE SURGERY      cataracts     GYN SURGERY       ORTHOPEDIC SURGERY       Social History:  Social History     Tobacco Use     Smoking status: Former Smoker     Smokeless tobacco: Never Used   Substance Use Topics     Alcohol use: No     Frequency: Never     Social History     Social History Narrative     Not on file     Family History:  Reviewed and non contributory to the case.    Allergies:  No Known Allergies  Medications:  Medications Prior to Admission   Medication Sig  Dispense Refill Last Dose     acetaminophen (TYLENOL) 325 MG tablet Take 325-650 mg by mouth every 4 hours as needed for mild pain        albuterol (PROAIR HFA/PROVENTIL HFA/VENTOLIN HFA) 108 (90 Base) MCG/ACT inhaler Inhale 1-2 puffs into the lungs every 4 hours as needed for shortness of breath / dyspnea or wheezing        amLODIPine (NORVASC) 2.5 MG tablet Take 2.5 mg by mouth daily   12/8/2018 at Unknown time     amoxicillin (AMOXIL) 500 MG capsule Take 2,000 mg by mouth as needed (1 hour prior to dental work)        aspirin 81 MG EC tablet Take 81 mg by mouth At Bedtime   12/8/2018 at Unknown time     atorvastatin (LIPITOR) 80 MG tablet Take 80 mg by mouth At Bedtime   12/8/2018 at Unknown time     azelastine (ASTELIN) 0.1 % nasal spray Spray 1 spray into both nostrils every morning   12/8/2018 at Unknown time     ferrous sulfate (FEROSUL) 325 (65 Fe) MG tablet Take 325 mg by mouth daily (with breakfast)   12/8/2018 at Unknown time     fexofenadine (ALLEGRA) 60 MG tablet Take 60 mg by mouth 2 times daily as needed for allergies        furosemide (LASIX) 20 MG tablet Take 10 mg by mouth three times a week MWF   12/7/2018     Iron-vit C-vit B12-folic acid 100-250-0.025-1 MG TABS Take 1 tablet by mouth daily   12/8/2018 at Unknown time     lisinopril-hydrochlorothiazide (PRINZIDE/ZESTORETIC) 10-12.5 MG tablet Take 1 tablet by mouth daily   12/8/2018 at Unknown time     metFORMIN (GLUCOPHAGE) 500 MG tablet Take 500 mg by mouth daily (with breakfast)   12/8/2018 at Unknown time     metoprolol succinate ER (TOPROL-XL) 100 MG 24 hr tablet Take 100 mg by mouth daily   12/8/2018 at Unknown time     olopatadine (PATANOL) 0.1 % ophthalmic solution Place 1 drop into both eyes every morning   12/8/2018 at Unknown time     vitamin D3 (CHOLECALCIFEROL) 2000 units tablet Take 1 tablet by mouth every morning   12/8/2018 at Unknown time     Review of Systems:  A Comprehensive greater than 10 system review of systems was carried  "out.  Pertinent positives and negatives are noted above.  Otherwise negative for contributory information.     Physical Exam:  Blood pressure (!) 173/96, resp. rate 18, height 1.6 m (5' 3\"), weight 71.7 kg (158 lb), SpO2 91 %.  Wt Readings from Last 1 Encounters:   12/09/18 71.7 kg (158 lb)     Exam:  General: Alert, awake, no acute distress.  HEENT: NC/AT, eyes anicteric and without injection, EOMI, Bilateral nasal packing in place.  Cardiac: RRR, normal S1, S2.  No murmurs/g/r.  No LE edema  Pulmonary: Normal chest rise, normal work of breathing.  Lungs CTAB  Abdomen: soft, non-tender, non-distended.  Normoactive BS.  No guarding or rebound tenderness.  Extremities: no deformities.  Warm, well perfused.  Skin: no rashes or lesions noted.  Warm and Dry.  Neuro: No focal deficits noted.  Speech clear.  Coordination and strength grossly normal.  Psych: Appropriate affect. Alert and oriented x3    Data Reviewed Today:  Imaging:  No imaging this admission  Labs:  Recent Labs   Lab 12/09/18 0924   WBC 9.2   HGB 11.6*   HCT 36.5   MCV 88   *     Recent Labs   Lab 12/09/18 0924   INR 1.00       Chelsea Lafleur MD  Hospitalist  [facility]             "

## 2018-12-09 NOTE — ED TRIAGE NOTES
Arrives via EMS from home with nosebleed.  Seen here at 2300 yesterday for nosebleed. This morning sneezed and bleeding restarted to left nare.  Clots present.  ABCD's intact; A/O x 4.

## 2018-12-09 NOTE — PHARMACY-ADMISSION MEDICATION HISTORY
Admission medication history interview status for this patient is complete. See [facility] admission navigator for allergy information, prior to admission medications and immunization status.     Medication history interview source(s):Patient  Medication history resources (including written lists, pill bottles, clinic record): care everywhere  Primary pharmacy:[facility]    Changes made to PTA medication list:  Added: all medications  Deleted: none  Changed: none    Actions taken by pharmacist (provider contacted, etc):None     Additional medication history information:None    Medication reconciliation/reorder completed by provider prior to medication history? No    Do you take OTC medications (eg tylenol, ibuprofen, fish oil, eye/ear drops, etc)? Y(Y/N)    For patients on insulin therapy: N (Y/N)  Lantus/levemir/NPH/Mix 70/30 dose:   (Y/N) (see Med list for doses)   Sliding scale Novolog Y/N  If Yes, do you have a baseline novolog pre-meal dose:  units with meals  Patients eat three meals a day:   Y/N    How many episodes of hypoglycemia do you have per week: _______  How many missed doses do you have per week: ______  How many times do you check your blood glucose per day: _______  Do you have a Continuous glucose monitor (CGM)   Y/N (remind pt that not approved for hospital use)   Any Barriers to therapy - Be specific :  cost of medications, comfortable with giving injections (if applicable), comfortable and confident with current diabetes regimen: Y/N ______________      Prior to Admission medications    Medication Sig Last Dose Taking? Auth Provider   acetaminophen (TYLENOL) 325 MG tablet Take 325-650 mg by mouth every 4 hours as needed for mild pain  Yes Unknown, Entered By History   albuterol (PROAIR HFA/PROVENTIL HFA/VENTOLIN HFA) 108 (90 Base) MCG/ACT inhaler Inhale 1-2 puffs into the lungs every 4 hours as needed for shortness of breath / dyspnea or wheezing  Yes Unknown, Entered By History   amLODIPine  (NORVASC) 2.5 MG tablet Take 2.5 mg by mouth daily 12/8/2018 at Unknown time Yes Unknown, Entered By History   amoxicillin (AMOXIL) 500 MG capsule Take 2,000 mg by mouth as needed (1 hour prior to dental work)  Yes Unknown, Entered By History   aspirin 81 MG EC tablet Take 81 mg by mouth At Bedtime 12/8/2018 at Unknown time Yes Unknown, Entered By History   atorvastatin (LIPITOR) 80 MG tablet Take 80 mg by mouth At Bedtime 12/8/2018 at Unknown time Yes Unknown, Entered By History   azelastine (ASTELIN) 0.1 % nasal spray Spray 1 spray into both nostrils every morning 12/8/2018 at Unknown time Yes Unknown, Entered By History   ferrous sulfate (FEROSUL) 325 (65 Fe) MG tablet Take 325 mg by mouth daily (with breakfast) 12/8/2018 at Unknown time Yes Unknown, Entered By History   fexofenadine (ALLEGRA) 60 MG tablet Take 60 mg by mouth 2 times daily as needed for allergies  Yes Unknown, Entered By History   furosemide (LASIX) 20 MG tablet Take 10 mg by mouth three times a week MW 12/7/2018 Yes Unknown, Entered By History   Iron-vit C-vit B12-folic acid 100-250-0.025-1 MG TABS Take 1 tablet by mouth daily 12/8/2018 at Unknown time Yes Unknown, Entered By History   lisinopril-hydrochlorothiazide (PRINZIDE/ZESTORETIC) 10-12.5 MG tablet Take 1 tablet by mouth daily 12/8/2018 at Unknown time Yes Unknown, Entered By History   metFORMIN (GLUCOPHAGE) 500 MG tablet Take 500 mg by mouth daily (with breakfast) 12/8/2018 at Unknown time Yes Unknown, Entered By History   metoprolol succinate ER (TOPROL-XL) 100 MG 24 hr tablet Take 100 mg by mouth daily 12/8/2018 at Unknown time Yes Unknown, Entered By History   olopatadine (PATANOL) 0.1 % ophthalmic solution Place 1 drop into both eyes every morning 12/8/2018 at Unknown time Yes Unknown, Entered By History   vitamin D3 (CHOLECALCIFEROL) 2000 units tablet Take 1 tablet by mouth every morning 12/8/2018 at Unknown time Yes Unknown, Entered By History

## 2018-12-09 NOTE — ED NOTES
.Observation Brochure and Video   observation status based on provider's order. Observation Brochure was given and video watched.  Mahnaz Gomes RN

## 2018-12-09 NOTE — ED NOTES
"[facility]  ED Nurse Handoff Report    Heather Martin is a 72 year old female   ED Chief complaint: Epistaxis  . ED Diagnosis:   Final diagnoses:   Epistaxis     Allergies: No Known Allergies    Code Status: Full Code  Activity level - Baseline/Home:  Independent. Activity Level - Current:   Stand with Assist. Lift room needed: No. Bariatric: No   Needed: No   Isolation: No. Infection: Not Applicable.     Vital Signs:   Vitals:    12/09/18 0853 12/09/18 0902 12/09/18 0903   BP:  (!) 173/96 (!) 173/96   Resp:  18    SpO2:  91%    Weight: 71.7 kg (158 lb)     Height: 1.6 m (5' 3\")         Cardiac Rhythm:  ,      Pain level:    Patient confused: No. Patient Falls Risk: Yes.   Elimination Status: has not voided   Patient Report - Initial Complaint: 72 year old female with a history of hypertension, coronary artery disease, type II diabetes, and paroxsymal atrial fibrillation, who presents to the emergency department today via EMS for evaluation of epistaxis. Per chart review, patient was in the emergency department last night, around 2300, for a nose bleed. Her left nare was cauterized and she was discharged with Afrin spray. This morning, she sneezed, and her left nare began bleeding again. She was not able to use her Afrin spray. EMS say she has had a clamp on for roughly 15 minutes. She denies dizziness and takes no blood thinners besides Aspirin.        Focused Assessment: HEENT - Nose WDL: WDL except; nose symptoms; nasal drainage Nose Symptoms: Left Nostril:; drainage Nasal Drainage: thick; bloody (numerous clots present, varying in size  Tests Performed: .  Labs Ordered and Resulted from Time of ED Arrival Up to the Time of Departure from the ED   CBC WITH PLATELETS DIFFERENTIAL - Abnormal; Notable for the following components:       Result Value    Hemoglobin 11.6 (*)     Platelet Count 112 (*)     All other components within normal limits   INR     .  No orders to display     . " Abnormal Results: .  Labs Ordered and Resulted from Time of ED Arrival Up to the Time of Departure from the ED   CBC WITH PLATELETS DIFFERENTIAL - Abnormal; Notable for the following components:       Result Value    Hemoglobin 11.6 (*)     Platelet Count 112 (*)     All other components within normal limits   INR     .  No orders to display     .   Treatments provided: suctioning, nasal packing  Family Comments: NA  OBS brochure/video discussed/provided to patient:  Yes  ED Medications:   Medications   oxymetazoline (AFRIN) 0.05 % spray (not administered)   lidocaine 1% with EPINEPHrine 1:100,000 1 %-1:[id number] injection (not administered)     Drips infusing:  No  For the majority of the shift, the patient's behavior Green. Interventions performed were NA.     Severe Sepsis OR Septic Shock Diagnosis Present: No      ED Nurse Name/Phone Number: LAURA Barry  10:28 AM    RECEIVING UNIT ED HANDOFF REVIEW    Above ED Nurse Handoff Report was reviewed: Yes  Reviewed by: Vernell Shook on December 9, 2018 at 10:40 AM

## 2018-12-09 NOTE — PLAN OF CARE
"PRIMARY DIAGNOSIS: EPISTAXIS   OUTPATIENT/OBSERVATION GOALS TO BE MET BEFORE DISCHARGE:  1. ADLs back to baseline: Yes    2. Activity and level of assistance: Up with standby assistance.    3. Pain status: Pain free.    4. Return to near baseline physical activity: Yes     Discharge Planner Nurse   Safe discharge environment identified: Yes  Barriers to discharge: No       Entered by: Diana Dias 12/09/2018 5:44 PM  /71   Temp 98.1  F (36.7  C) (Oral)   Resp 16   Ht 1.6 m (5' 3\")   Wt 61.1 kg (134 lb 9.6 oz)   SpO2 95%   BMI 23.84 kg/m    A&Ox4. BP slightly hypertensive, VSS otherwise. CMS intact. Up w/ SBA. Ambulated in room. Bilateral nasal packing in place, moist w/ small amt. of dark red drainage. BS active x4, tolerating regular diet, passing flatus. Voiding in adequate amts. Coughed up clots earlier today, no episodes this evening. Keflex initiated. Glucose checks BID. Denies pain. SL. Will continue to monitor.     Please review provider order for any additional goals.   Nurse to notify provider when observation goals have been met and patient is ready for discharge.  "

## 2018-12-09 NOTE — PLAN OF CARE
ROOM # 230    Living Situation (if not independent, order SW consult): Lives alone in [facility]   Facility name: NA   : Marcela Garcias @ [phone]    Activity level at baseline: Independent   Activity level on admit: SBA       Patient registered to observation; given Patient Bill of Rights; given the opportunity to ask questions about observation status and their plan of care.  Patient has been oriented to the observation room, bathroom and call light is in place.    Discussed discharge goals and expectations with patient/family.

## 2018-12-10 VITALS
RESPIRATION RATE: 16 BRPM | SYSTOLIC BLOOD PRESSURE: 183 MMHG | BODY MASS INDEX: 23.85 KG/M2 | HEIGHT: 63 IN | WEIGHT: 134.6 LBS | OXYGEN SATURATION: 96 % | TEMPERATURE: 98.2 F | DIASTOLIC BLOOD PRESSURE: 85 MMHG

## 2018-12-10 LAB
ERYTHROCYTE [DISTWIDTH] IN BLOOD BY AUTOMATED COUNT: 14.1 % (ref 10–15)
GLUCOSE BLDC GLUCOMTR-MCNC: 141 MG/DL (ref 70–99)
GLUCOSE BLDC GLUCOMTR-MCNC: 170 MG/DL (ref 70–99)
HCT VFR BLD AUTO: 32.4 % (ref 35–47)
HGB BLD-MCNC: 10.5 G/DL (ref 11.7–15.7)
MCH RBC QN AUTO: 28.2 PG (ref 26.5–33)
MCHC RBC AUTO-ENTMCNC: 32.4 G/DL (ref 31.5–36.5)
MCV RBC AUTO: 87 FL (ref 78–100)
PLATELET # BLD AUTO: 89 10E9/L (ref 150–450)
RBC # BLD AUTO: 3.73 10E12/L (ref 3.8–5.2)
WBC # BLD AUTO: 12.7 10E9/L (ref 4–11)

## 2018-12-10 PROCEDURE — 00000146 ZZHCL STATISTIC GLUCOSE BY METER IP

## 2018-12-10 PROCEDURE — 25000132 ZZH RX MED GY IP 250 OP 250 PS 637: Mod: GY | Performed by: INTERNAL MEDICINE

## 2018-12-10 PROCEDURE — G0378 HOSPITAL OBSERVATION PER HR: HCPCS

## 2018-12-10 PROCEDURE — A9270 NON-COVERED ITEM OR SERVICE: HCPCS | Mod: GY | Performed by: INTERNAL MEDICINE

## 2018-12-10 PROCEDURE — 99217 ZZC OBSERVATION CARE DISCHARGE: CPT | Performed by: PHYSICIAN ASSISTANT

## 2018-12-10 PROCEDURE — 36415 COLL VENOUS BLD VENIPUNCTURE: CPT | Performed by: INTERNAL MEDICINE

## 2018-12-10 PROCEDURE — 85027 COMPLETE CBC AUTOMATED: CPT | Performed by: INTERNAL MEDICINE

## 2018-12-10 RX ORDER — CEPHALEXIN 250 MG/5ML
500 POWDER, FOR SUSPENSION ORAL 4 TIMES DAILY
Qty: 280 ML | Refills: 0 | Status: SHIPPED | OUTPATIENT
Start: 2018-12-10 | End: 2018-12-17

## 2018-12-10 RX ADMIN — AMLODIPINE BESYLATE 2.5 MG: 2.5 TABLET ORAL at 09:08

## 2018-12-10 RX ADMIN — METOPROLOL SUCCINATE 100 MG: 100 TABLET, EXTENDED RELEASE ORAL at 09:08

## 2018-12-10 RX ADMIN — LISINOPRIL AND HYDROCHLOROTHIAZIDE 1 TABLET: 12.5; 1 TABLET ORAL at 09:08

## 2018-12-10 RX ADMIN — METFORMIN HYDROCHLORIDE 500 MG: 500 TABLET ORAL at 09:08

## 2018-12-10 RX ADMIN — CEPHALEXIN 500 MG: 250 POWDER, FOR SUSPENSION ORAL at 09:39

## 2018-12-10 NOTE — PLAN OF CARE
PRIMARY DIAGNOSIS: UTI  Patient improving    OUTPATIENT/OBSERVATION GOALS TO BE MET BEFORE DISCHARGE  1. Orthostatic performed: No    2. Tolerating PO medications: Yes    3. Return to near baseline physical activity: Yes    4. Cleared for discharge by consultants (if involved): No    Discharge Planner Nurse   Safe discharge environment identified: Yes  Barriers to discharge: Yes       Entered by: Daysi Randall 12/10/2018 9:26 AM     Please review provider order for any additional goals.   Nurse to notify provider when observation goals have been met and patient is ready for discharge.    Patient is alert and oriented x4. VS WNL and documented on the FS. Lung sounds clear. Patient is on RA. Rhino rockets in place in bilateral nares. No new drainage. Active bowel sounds in all 4 quadrants. Voiding without difficulty. Denies any numbness or tingling. Denies pain. Patient tolerating diet. BS was 141 this morning. Patient is a SBA with gaitbelt when up. Plan: Possible discharge with follow up with ENT.

## 2018-12-10 NOTE — DISCHARGE SUMMARY
"[facility] Observation Unit Discharge Summary          Heather Martin MRN# [id number]   Age: 72 year old YOB: 1945     Date of Admission:  12/9/2018  Date of Discharge::  12/10/2018  Admitting Physician:  Chelsea Lafleur MD  Discharge Physician:  Sudha Jean PA-C  Primary Physician: No Ref-Primary, Physician     Primary Discharge Diagnoses:   Epistaxis  Acute Blood Loss Anemia     Secondary Discharge Diagnoses:   CAD  DM Type 2  Chronic Thrombocytopenia     Hospital Course:   For detail history, please refer to H & P from 12/9/2018. In brief, this is a 72 year old female with PMH including CAD (prior CABG), HTN, DM (on oral agents) who was admitted 12/09/18 with recurrent epistaxis    Patient presented to the ED on 12/9/18 with epistaxis and bleeding was controlled with pressure and silver nitrate cautery and she was discharged home.   She had a recurrence of her bleeding and returned to the ED.  She is s/p bilateral packing in the ER.  She was admitted to the observation unit and her bleeding subsided.  Hemoglobin dropped from 11.6-10.5.  Patient was discussed with ENT on admission who recommended continuing bilateral packing and follow up in clinic of 12/12/18 for packing removal.  Patient was prescribed po Keflex for prophylaxis.  Patient to follow up with PCP within one week for repeat cbc.    Procedures/Imaging:   None    Consultations:   ENT    Code Status:   Full    Allergies:   No Known Allergies     Subjective:   Patient reports she has the sensation she needs to sneeze.  She denies any post nasal drip.  She has had no further coughing.  Denies shortness of breath.  Tolerating po well.    Physical Exam:   Blood pressure 169/83, temperature 98.1  F (36.7  C), temperature source Oral, resp. rate 16, height 1.6 m (5' 3\"), weight 61.1 kg (134 lb 9.6 oz), SpO2 96 %. on room air.  General: Alert, interactive, NAD, sitting up in bed, pleasant and interative  HEENT: AT/NC, " sclera anicteric, PERRL, EOMI, bilateral nares with packing in place with dried blood present  Resp: clear to auscultation bilaterally  Cardiac: regular rate and rhythm, no murmur  Abdomen: Soft, nontender, nondistended. +BS.  No rebound or guarding.  Neuro: Alert & oriented x 3, no focal deficits, moves all extremities equally     Discharge Medicatios:        Current Discharge Medication List      START taking these medications    Details   cephALEXin (KEFLEX) 250 MG/5ML suspension Take 10 mLs (500 mg) by mouth 4 times daily for 7 days  Qty: 280 mL, Refills: 0    Associated Diagnoses: Epistaxis         CONTINUE these medications which have NOT CHANGED    Details   acetaminophen (TYLENOL) 325 MG tablet Take 325-650 mg by mouth every 4 hours as needed for mild pain      albuterol (PROAIR HFA/PROVENTIL HFA/VENTOLIN HFA) 108 (90 Base) MCG/ACT inhaler Inhale 1-2 puffs into the lungs every 4 hours as needed for shortness of breath / dyspnea or wheezing      amLODIPine (NORVASC) 2.5 MG tablet Take 2.5 mg by mouth daily      amoxicillin (AMOXIL) 500 MG capsule Take 2,000 mg by mouth as needed (1 hour prior to dental work)      aspirin 81 MG EC tablet Take 81 mg by mouth At Bedtime      atorvastatin (LIPITOR) 80 MG tablet Take 80 mg by mouth At Bedtime      ferrous sulfate (FEROSUL) 325 (65 Fe) MG tablet Take 325 mg by mouth daily (with breakfast)      fexofenadine (ALLEGRA) 60 MG tablet Take 60 mg by mouth 2 times daily as needed for allergies      furosemide (LASIX) 20 MG tablet Take 10 mg by mouth three times a week MWF      Iron-vit C-vit B12-folic acid 100-250-0.025-1 MG TABS Take 1 tablet by mouth daily      lisinopril-hydrochlorothiazide (PRINZIDE/ZESTORETIC) 10-12.5 MG tablet Take 1 tablet by mouth daily      metFORMIN (GLUCOPHAGE) 500 MG tablet Take 500 mg by mouth daily (with breakfast)      metoprolol succinate ER (TOPROL-XL) 100 MG 24 hr tablet Take 100 mg by mouth daily      olopatadine (PATANOL) 0.1 %  ophthalmic solution Place 1 drop into both eyes every morning      vitamin D3 (CHOLECALCIFEROL) 2000 units tablet Take 1 tablet by mouth every morning         STOP taking these medications       azelastine (ASTELIN) 0.1 % nasal spray Comments:   Reason for Stopping:               Instructions Given to Patient as Discharge:     Discharge Procedure Orders   Reason for your hospital stay   Order Comments: You were hospitalized due to a bloody nose.  Please follow up with ENT in clinic on 12/12/18 and continue the antibiotic prescribed.     Follow-up and recommended labs and tests    Order Comments: Please follow up with your PCP for monitoring of your hemoglobin within one week.     Activity   Order Comments: Your activity upon discharge: activity as tolerated     Order Specific Question Answer Comments   Is discharge order? Yes      When to contact your care team   Order Comments: Notify for fever >101.5; black or bloody stools; severe, persistent, or worsening nausea, vomiting, abdominal pain or distention, diarrhea, constipation; chest pain, difficulty breathing, swelling; dizziness, weakness, lightheadedness, fainting.  Proceed to the nearest emergency room for any urgent concerns.     Diet   Order Comments: Follow this diet upon discharge: Orders Placed This Encounter      Regular Diet Adult     Order Specific Question Answer Comments   Is discharge order? Yes        Pending Tests at Discharge:   none    Discharge Disposition:   Discharged to home     Sudha Jean MS, PA-C  Hospitalist Service  Pager [phone]    >30 minutes was spent in discharge planning, care coordination, physical examination and medication reconciliation on the date of discharge, 12/10/2018

## 2018-12-10 NOTE — PLAN OF CARE
PRIMARY DIAGNOSIS: epistaxis  OUTPATIENT/OBSERVATION GOALS TO BE MET BEFORE DISCHARGE:  1. ADLs back to baseline: Yes    2. Activity and level of assistance: Up with standby assistance via gait belt and walker    3. Pain status: Pain free. Pt often will c/o leg cramps, PRN Bengay applied for pain relief, pt also reports walking helps to reduce the leg cramp pain. Denies pain r/t epistaxis.    4. Return to near baseline physical activity: Yes     Discharge Planner Nurse   Safe discharge environment identified: Yes  Barriers to discharge: Unknown      Please review provider order for any additional goals.   Nurse to notify provider when observation goals have been met and patient is ready for discharge.    Vitals are Temp: 98.3  F (36.8  C) Temp src: Oral BP: 171/73   Heart Rate: 99 Resp: 18 SpO2: 95 %  Nasal packing in bilateral nares, saturated dressings, no signs of active bleeding. Pt continues to utilize ice chips for dry mouth, has regular diet. Plan to continue the nasal packing and for pt to follow up with ENT in the clinic on Wednesday for packing removal, pt to receive keflex prophylaxis, continue to monitor.

## 2018-12-10 NOTE — PLAN OF CARE
"Patient's After Visit Summary was reviewed with patient.   Patient verbalized understanding of After Visit Summary, recommended follow up and was given an opportunity to ask questions.   Discharge medications sent home with patient/family: YES, Keflex x3 bottles     Discharged with transport tech and daughter with all belongings. Patient medically cleared to discharge. No active bleeding.     /85 (BP Location: Left arm)   Temp 98.2  F (36.8  C) (Oral)   Resp 16   Ht 1.6 m (5' 3\")   Wt 61.1 kg (134 lb 9.6 oz)   SpO2 96%   BMI 23.84 kg/m      OBSERVATION patient END time: 1215       "

## 2018-12-10 NOTE — PLAN OF CARE
"PRIMARY DIAGNOSIS: EPISTAXIS  OUTPATIENT/OBSERVATION GOALS TO BE MET BEFORE DISCHARGE:  1. ADLs back to baseline: Yes    2. Activity and level of assistance: Up with standby assistance.    3. Pain status: Improved-controlled with oral pain medications (C/o bilateral leg cramping, resolved w/ po Tylenol.    4. Return to near baseline physical activity: Yes.     Discharge Planner Nurse   Safe discharge environment identified: Yes  Barriers to discharge: No       Entered by: Diana Dias 12/09/2018 10:06 PM  /73   Temp 97.5  F (36.4  C) (Oral)   Resp 16   Ht 1.6 m (5' 3\")   Wt 61.1 kg (134 lb 9.6 oz)   SpO2 96%   BMI 23.84 kg/m    A&Ox4. VSS otherwise. CMS intact. Up w/ SBA. Ambulated in room. Bilateral nasal packing in place, moist w/ small amt. of dark red drainage. BS active x4, tolerating regular diet, passing flatus. Voiding in adequate amts. Coughed up clots earlier today, no episodes this evening. On scheduled Keflex. Glucose checks BID. C/o bilaterally leg cramping, resolved w/ po Tylenol. SL. Will continue to monitor.     Please review provider order for any additional goals.   Nurse to notify provider when observation goals have been met and patient is ready for discharge.  "

## 2018-12-10 NOTE — DISCHARGE INSTRUCTIONS
Please follow up with your PCP for ongoing monitoring of your platelets    Follow up with ENT specialty Care in [place] with Dr. Williamson on Wed 12/12 at 1:40pm. Please check in at 1:30 pm with insurance card. Address to this location is [id number], [place].

## 2018-12-10 NOTE — PLAN OF CARE
PRIMARY DIAGNOSIS: epistaxis  OUTPATIENT/OBSERVATION GOALS TO BE MET BEFORE DISCHARGE:  ADLs back to baseline: Yes    Activity and level of assistance: Up with standby assistance.    Pain status: Pain free.    Return to near baseline physical activity: Yes     Discharge Planner Nurse   Safe discharge environment identified: Yes  Barriers to discharge: Unknown      Please review provider order for any additional goals.   Nurse to notify provider when observation goals have been met and patient is ready for discharge.    Vitals are Temp: 97.8  F (36.6  C) Temp src: Oral BP: 163/68   Heart Rate: 78 Resp: 16 SpO2: 96 %  Pt A&Ox4, sleeping without concern, LS clear on auscultation, bowel sounds present. Nasal packing in bilateral nares, saturated dressings, no signs of active bleeding. Pt has ice chips at bedside d/t dry mouth. Plan to continue the nasal packing and for pt to follow up with ENT in the clinic on Wednesday for packing removal, pt to receive keflex prophylaxis, continue to monitor.

## 2020-03-10 ENCOUNTER — HEALTH MAINTENANCE LETTER (OUTPATIENT)
Age: 75
End: 2020-03-10

## 2020-12-27 ENCOUNTER — HEALTH MAINTENANCE LETTER (OUTPATIENT)
Age: 75
End: 2020-12-27

## 2021-01-21 ENCOUNTER — APPOINTMENT (OUTPATIENT)
Dept: CT IMAGING | Facility: CLINIC | Age: 76
End: 2021-01-21
Attending: EMERGENCY MEDICINE
Payer: MEDICARE

## 2021-01-21 ENCOUNTER — HOSPITAL ENCOUNTER (EMERGENCY)
Facility: CLINIC | Age: 76
Discharge: HOME | End: 2021-01-21
Attending: EMERGENCY MEDICINE | Admitting: EMERGENCY MEDICINE
Payer: MEDICARE

## 2021-01-21 VITALS
TEMPERATURE: 97.8 F | RESPIRATION RATE: 18 BRPM | SYSTOLIC BLOOD PRESSURE: 174 MMHG | WEIGHT: 156 LBS | DIASTOLIC BLOOD PRESSURE: 81 MMHG | HEIGHT: 63 IN | BODY MASS INDEX: 27.64 KG/M2 | OXYGEN SATURATION: 95 % | HEART RATE: 84 BPM
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
## 2021-01-21 DIAGNOSIS — S00.93XA CONTUSION OF HEAD, UNSPECIFIED PART OF HEAD, INITIAL ENCOUNTER: ICD-10-CM

## 2021-01-21 DIAGNOSIS — R04.0 EPISTAXIS: ICD-10-CM

## 2021-01-21 PROCEDURE — 99284 EMERGENCY DEPT VISIT MOD MDM: CPT | Mod: 25

## 2021-01-21 PROCEDURE — 70450 CT HEAD/BRAIN W/O DYE: CPT | Mod: MG

## 2021-01-21 PROCEDURE — 250N000013 HC RX MED GY IP 250 OP 250 PS 637: Performed by: EMERGENCY MEDICINE

## 2021-01-21 RX ORDER — METOPROLOL SUCCINATE 50 MG/1
100 TABLET, EXTENDED RELEASE ORAL DAILY
Status: DISCONTINUED | OUTPATIENT
Start: 2021-01-21 | End: 2021-01-21

## 2021-01-21 RX ORDER — METOPROLOL SUCCINATE 50 MG/1
100 TABLET, EXTENDED RELEASE ORAL ONCE
Status: COMPLETED | OUTPATIENT
Start: 2021-01-21 | End: 2021-01-21

## 2021-01-21 RX ORDER — OXYMETAZOLINE HYDROCHLORIDE 0.05 G/100ML
SPRAY NASAL
Status: DISCONTINUED
Start: 2021-01-21 | End: 2021-01-21 | Stop reason: HOSPADM

## 2021-01-21 RX ADMIN — HYDROCHLOROTHIAZIDE: 12.5 CAPSULE ORAL at 04:33

## 2021-01-21 RX ADMIN — METOPROLOL SUCCINATE 100 MG: 50 TABLET, EXTENDED RELEASE ORAL at 04:14

## 2021-01-21 ASSESSMENT — ENCOUNTER SYMPTOMS
VOMITING: 0
DIARRHEA: 0
SHORTNESS OF BREATH: 0
FEVER: 0
ABDOMINAL PAIN: 0
NECK PAIN: 0
COUGH: 0
CONSTIPATION: 0
CHILLS: 0
NAUSEA: 0
WOUND: 1

## 2021-01-21 ASSESSMENT — MIFFLIN-ST. JEOR: SCORE: 1166.74

## 2021-01-21 NOTE — ED TRIAGE NOTES
Pt to ED via EMS from home s/p fall. Pt reports getting up to use the bathroom, slipping and falling forward landing on forearms and nose. Nose clamp in place at this time. Pt denies any blood thinner use, LOC and is A/O x4. No active bleeding at this time. Pt denies any pain.

## 2021-01-21 NOTE — ED PROVIDER NOTES
"History   Chief Complaint:  Fall     HPI   Heather Martin is a 75 year old female with history of diabetes, hypertension, and hyperlipidemia who presents via EMS for evaluation secondary to a fall. The patient reports just prior to arrival she got up to use the bathroom and slipped on the floor, falling face forward and hitting her face. He states she was no able to get up off the floor but attributes this to an artificial knee and hip not injury. She denies nose pain, neck pain, and any other injuries/symptoms.     Review of Systems   Constitutional: Negative for chills and fever.   HENT: Positive for nosebleeds.    Respiratory: Negative for cough and shortness of breath.    Cardiovascular: Negative for chest pain.   Gastrointestinal: Negative for abdominal pain, constipation, diarrhea, nausea and vomiting.   Musculoskeletal: Negative for neck pain.   Skin: Positive for wound.   All other systems reviewed and are negative.    Allergies:  No known drug allergies     Medications:  Albuterol   Amlodipine   Aspirin 81  Atorvastatin   Ferosul  Lasix  Lisinopril-hydrochlorothiazide   Metformin   Metoprolol succinate   Cholecalciferol     Past Medical History:    Diabetes   Heart disease  Hypertension   Hyperthyroidism   Cholecystitis   Pancreatitis   Hyperlipidemia     Past Surgical History:    Bypass  Cholecystectomy   Cataracts   Total right knee arthroplasty  Right total hip arthroplasty   GYN surgery      Family History:    Cholecystitis   Stroke   Lymphoma     Social History:  Patient presents unaccompanied to the ED.   Presents via EMS.     Physical Exam     Patient Vitals for the past 24 hrs:   BP Temp Temp src Pulse Resp SpO2 Height Weight   01/21/21 0250 (!) 177/79 -- -- 75 -- 96 % -- --   01/21/21 0242 (!) 199/74 97.8  F (36.6  C) Oral 81 18 98 % 1.6 m (5' 3\") 70.8 kg (156 lb)     Physical Exam     Constitutional:  Oriented to person, place, and time. Well appearing.   HENT:    Dried blood on bilateral nares, " no active bleeding. No deformity or nasal tenderness.  Head:    Normocephalic. Small left forehead contusion  Mouth/Throat:   Oropharynx is clear and moist.   Eyes:    EOM are normal. Pupils are equal, round, and reactive to light.   Neck:    Neck supple.   Cardiovascular:  Normal rate, regular rhythm and normal heart sounds.      Exam reveals no gallop and no friction rub.       No murmur heard.  Pulmonary/Chest:  Effort normal and breath sounds normal.      No respiratory distress. No wheezes. No rales.      No reproducible chest wall pain.  Abdominal:   Soft. No distension. No tenderness. No rebound and no guarding.   Musculoskeletal:  Normal range of motion. No C spine tenderness.   Neurological:   Alert and oriented to person, place, and time.           Moves all 4 extremities spontaneously       GCS: 15.   Skin:    No rash noted. No pallor.      Emergency Department Course     Imaging:  CT Head wo contrast   1.  No acute intracranial hemorrhage or calvarial fracture.  2.  Mild to moderate volume loss.  3.  Mild presumed chronic small vessel ischemic changes.  4.  Advanced degenerative changes left temporomandibular joint.  Reading per radiology     Emergency Department Course:  Reviewed:  0250: I reviewed the patient's nursing notes, vitals, past medical records, and Care Everywhere.     Assessments:  0254: I performed an exam of the patient as documented above.     0353: I rechecked the patient and discussed the ED workup thus far.  The patient is ambulating and has no further epistaxis.     Interventions:  0254 Afrin Bilateral nares     0414 Metoprolol succinate 100 mg PO    0433 lisinopril-hydrochlorothiazide 10-12.5 mg PO    Disposition:  Discharged to home.    Impression & Plan         Medical Decision Making:  Heather Martin is a 75 year old female who presents for evaluation after fall with trauma to the head. Details of the patent's history can be noted in the HPI. Upon my exam, the patient appeared  well and was neurologically intact. No signs of open wound, no visual or palpable deformities or edema. Exam most consistent with head contusion.  The patient's epistaxis was traumatic and after Afrin and nasal clamps has resolved. I do not believe she needs nasal packing at this time. She is currently ambulatory at baseline without any difficulty walking and is appropriate for outpatient management. The patient's head to toe trauma exam was otherwise negative for any other serious injuries or disease processes of the head, neck, chest, abdomen, extremities, pelvis.  Full range of motion of neck without discomfort. No cervical, thoracic, spinal tenderness. No signs of laceration. She has a history and clinical exam consistent with contusion to head. Less likely concussion given scenario but discussed with the patient. However, due to the patient's mechanism of injury, head CT was completed per [facility] CT rules. The differential diagnosis includes skull fracture, epidural hematoma, subdural hematoma, intracerebral hemorrhage, and traumatic subarachnoid hemorrhage; all of these are highly unlikely in this clinical setting. CT imaging results returned without acute pathology.     The patient's will return to the ED for any red flag signs, including change in behavior, severe headache, drowsiness, seizures, vomiting, etc.  Home precautions and symptomatic care discussed.  We also discussed second impact syndrome in the instance in which the patient would have a concussion.  The patient will be rechecked by their primary care provider within the next 2-3 days.  All questions were answered by the patient's discharge.  The patient was in agreement with the treatment plan as stated above.    Diagnosis:    ICD-10-CM   1. Fall, initial encounter  W19.XXXA   2. Contusion of head, unspecified part of head, initial encounter  S00.93XA   3. Epistaxis  R04.0     Scribe Disclosure:  IRosie, am serving as a scribe at  2:52 AM on 1/21/2021 to document services personally performed by Tremaine Velazquez MD based on my observations and the provider's statements to me.      Tremaine Velazquez MD  01/21/21 0530

## 2021-01-21 NOTE — ED NOTES
Nose clamp removed at 0350. Pt ambulated in the hallway to bathroom and back to room. Denies any dizziness. Steady on feet with an arm, normally uses walker at home when walking a distance. No bleeding noted. Given warm blankets and orange juice.

## 2021-01-21 NOTE — DISCHARGE INSTRUCTIONS
Discharge Instructions  Head Injury    You have been seen today for a head injury. Your evaluation included a history and physical examination. You may have had a CT (CAT) scan performed, though most head injuries do not require a scan. Based on this evaluation, your provider today does not feel that your head injury is serious.    Generally, every Emergency Department visit should have a follow-up clinic visit with either a primary or a specialty clinic/provider. Please follow-up as instructed by your emergency provider today.  Return to the Emergency Department if:  You are confused or you are not acting right.  Your headache gets worse or you start to have a really bad headache even with your recommended treatment plan.  You vomit (throw up) more than once.  You have a seizure.  You have trouble walking.  You have weakness or paralysis (cannot move) in an arm or a leg.  You have blood or fluid coming from your ears or nose.  You have new symptoms or anything that worries you.    Sleeping:  It is okay for you to sleep, but someone should wake you up if instructed by your provider, and someone should check on you at your usual time to wake up.     Activity:  Do not drive for at least 24 hours.  Do not drive if you have dizzy spells or trouble concentrating, or remembering things.  Do not return to any contact sports until cleared by your regular provider.     MORE INFORMATION:    Concussion:  A concussion is a minor head injury that may cause temporary problems with the way the brain works. Although concussions are important, they are generally not an emergency or a reason that a person needs to be hospitalized. Some concussion symptoms include confusion, amnesia (forgetful), nausea (sick to your stomach) and vomiting (throwing up), dizziness, fatigue, memory or concentration problems, irritability and sleep problems. For most people, concussions are mild and temporary but some will have more severe and persistent  "symptoms that require on-going care and treatment.  CT Scans: Your evaluation today may have included a CT scan (CAT scan) to look for things like bleeding or a skull fracture (broken bone).  CT scans involve radiation and too many CT scans can cause serious health problems like cancer, especially in children.  Because of this, your provider may not have ordered a CT scan today if they think you are at low risk for a serious or life threatening problem.    If you were given a prescription for medicine here today, be sure to read all of the information (including the package insert) that comes with your prescription.  This will include important information about the medicine, its side effects, and any warnings that you need to know about.  The pharmacist who fills the prescription can provide more information and answer questions you may have about the medicine.  If you have questions or concerns that the pharmacist cannot address, please call or return to the Emergency Department.     Remember that you can always come back to the Emergency Department if you are not able to see your regular provider in the amount of time listed above, if you get any new symptoms, or if there is anything that worries you.  Discharge Instructions  Epistaxis    Today you were seen for a nosebleed.   Nosebleeds (the medical term is \"epistaxis\") are very common. Almost every person has had at least one in their lifetime.  Although the amount of blood loss can appear dramatic, nosebleeds rarely cause serious problems.  The most common causes are dry air or nose picking, but they also are common in people who have allergies, high blood pressure, or are on blood thinners (such as Coumadin, Aspirin or Plavix). If you or your child gets a nosebleed, the important thing is to know how to take care of it. With the right self-care, most nosebleeds will stop on their own.    Generally, every Emergency Department visit should have a follow-up " clinic visit with either a primary or a specialty clinic/provider. Please follow-up as instructed by your emergency provider today.    Return to the Emergency Department if:  Your nose is bleeding a very large amount of blood and you are unable to stop it.  You get very pale, faint, or tired.  You cannot get the bleeding to stop after following these instructions.    Treatment:  Your provider may tell you to use a decongestant nose spray, like Afrin  (oxymetazoline), in both nostrils in the morning and at night for the next three days. Do not use this medicine for more than three days at a time.  If you do, it will cause nasal congestion.   Use a moisturizer. A small amount of Vaseline  to the inside of your nostrils for moisture before bed is one option. There are nasal sprays available over-the-counter for this purpose as well.  Using a humidifier in your bedroom or home will help as well when the air is dry.  For the next three days, do not blow your nose or put anything in your nose. You may sniffle, or dab the outside of your nose.  Do not bend with your head below your waist for the next three days. Do not lift anything so heavy that you have to strain.   If you received nasal packing, please do not remove the packing until seen by an Ear, Nose, and Throat (ENT) specialist.  If antibiotics have been given with the packing, please take as directed.    If your nose starts to bleed again:  Blow your nose to get rid of some of the clots that have formed inside your nostrils. This may increase the bleeding temporarily, but that is okay.  Spray decongestant nose spray (like Afrin ) into both nostrils to constrict the blood vessels.  Sit or stand while bending forward slightly at the waist. Do not lie down or tilt your head back. This may cause you to swallow blood and can lead to vomiting.   the soft part of BOTH nostrils at the bottom of your nose and squeeze your nose closed for at least 5 minutes (for  children) or 10 to 15 minutes (for adults). Use a clock to time yourself. Do not release the pressure every so often to check whether the bleeding has stopped. Many people hurt their chances of stopping the bleeding by releasing the pressure too soon or too often.    If you follow the steps outlined above, and your nose continues to bleed, repeat all the steps once more. Apply pressure for a total of at least 30 minutes. If you continue to bleed even then, seek medical attention.  If you were given a prescription for medicine here today, be sure to read all of the information (including the package insert) that comes with your prescription.  This will include important information about the medicine, its side effects, and any warnings that you need to know about.  The pharmacist who fills the prescription can provide more information and answer questions you may have about the medicine.  If you have questions or concerns that the pharmacist cannot address, please call or return to the Emergency Department.   Remember that you can always come back to the Emergency Department if you are not able to see your regular provider in the amount of time listed above, if you get any new symptoms, or if there is anything that worries you.

## 2021-04-24 ENCOUNTER — HEALTH MAINTENANCE LETTER (OUTPATIENT)
Age: 76
End: 2021-04-24

## 2021-05-04 ENCOUNTER — HOSPITAL ENCOUNTER (INPATIENT)
Facility: CLINIC | Age: 76
LOS: 2 days | Discharge: HOME | DRG: 293 | End: 2021-05-06
Attending: PHYSICIAN ASSISTANT | Admitting: INTERNAL MEDICINE
Payer: MEDICARE

## 2021-05-04 ENCOUNTER — APPOINTMENT (OUTPATIENT)
Dept: CT IMAGING | Facility: CLINIC | Age: 76
DRG: 293 | End: 2021-05-04
Attending: PHYSICIAN ASSISTANT
Payer: MEDICARE

## 2021-05-04 DIAGNOSIS — R06.09 DYSPNEA ON EXERTION: ICD-10-CM

## 2021-05-04 DIAGNOSIS — D64.9 ANEMIA: ICD-10-CM

## 2021-05-04 DIAGNOSIS — D50.9 IRON DEFICIENCY ANEMIA, UNSPECIFIED IRON DEFICIENCY ANEMIA TYPE: ICD-10-CM

## 2021-05-04 DIAGNOSIS — R79.89 ELEVATED BRAIN NATRIURETIC PEPTIDE (BNP) LEVEL: ICD-10-CM

## 2021-05-04 DIAGNOSIS — J90 PLEURAL EFFUSION: ICD-10-CM

## 2021-05-04 DIAGNOSIS — I50.33 ACUTE ON CHRONIC DIASTOLIC HEART FAILURE (H): Primary | ICD-10-CM

## 2021-05-04 LAB
ABO + RH BLD: NORMAL
ABO + RH BLD: NORMAL
BASOPHILS # BLD AUTO: 0 10E9/L (ref 0–0.2)
BASOPHILS NFR BLD AUTO: 0.6 %
BLD GP AB SCN SERPL QL: NORMAL
BLD PROD TYP BPU: NORMAL
BLOOD BANK CMNT PATIENT-IMP: NORMAL
CREAT BLD-MCNC: 0.9 MG/DL (ref 0.52–1.04)
DIFFERENTIAL METHOD BLD: ABNORMAL
EOSINOPHIL # BLD AUTO: 0.3 10E9/L (ref 0–0.7)
EOSINOPHIL NFR BLD AUTO: 4.6 %
ERYTHROCYTE [DISTWIDTH] IN BLOOD BY AUTOMATED COUNT: 20.5 % (ref 10–15)
FERRITIN SERPL-MCNC: 22 NG/ML (ref 8–252)
GFR SERPL CREATININE-BSD FRML MDRD: 61 ML/MIN/{1.73_M2}
HCT VFR BLD AUTO: 25 % (ref 35–47)
HEMOCCULT STL QL: NEGATIVE
HGB BLD-MCNC: 7.2 G/DL (ref 11.7–15.7)
IMM GRANULOCYTES # BLD: 0 10E9/L (ref 0–0.4)
IMM GRANULOCYTES NFR BLD: 0.3 %
IRON SATN MFR SERPL: 4 % (ref 15–46)
IRON SERPL-MCNC: 19 UG/DL (ref 35–180)
LABORATORY COMMENT REPORT: NORMAL
LYMPHOCYTES # BLD AUTO: 1.2 10E9/L (ref 0.8–5.3)
LYMPHOCYTES NFR BLD AUTO: 18.2 %
MCH RBC QN AUTO: 22.7 PG (ref 26.5–33)
MCHC RBC AUTO-ENTMCNC: 28.8 G/DL (ref 31.5–36.5)
MCV RBC AUTO: 79 FL (ref 78–100)
MONOCYTES # BLD AUTO: 0.3 10E9/L (ref 0–1.3)
MONOCYTES NFR BLD AUTO: 5.3 %
NEUTROPHILS # BLD AUTO: 4.6 10E9/L (ref 1.6–8.3)
NEUTROPHILS NFR BLD AUTO: 71 %
NRBC # BLD AUTO: 0 10*3/UL
NRBC BLD AUTO-RTO: 0 /100
NUM BPU REQUESTED: 1
PLATELET # BLD AUTO: 312 10E9/L (ref 150–450)
RADIOLOGIST FLAGS: NORMAL
RBC # BLD AUTO: 3.17 10E12/L (ref 3.8–5.2)
RETICS # AUTO: 87.4 10E9/L (ref 25–95)
RETICS/RBC NFR AUTO: 2.7 % (ref 0.5–2)
SARS-COV-2 RNA RESP QL NAA+PROBE: NEGATIVE
SPECIMEN EXP DATE BLD: NORMAL
SPECIMEN SOURCE: NORMAL
TIBC SERPL-MCNC: 436 UG/DL (ref 240–430)
TROPONIN I SERPL-MCNC: 0.02 UG/L (ref 0–0.04)
WBC # BLD AUTO: 6.5 10E9/L (ref 4–11)

## 2021-05-04 PROCEDURE — 99285 EMERGENCY DEPT VISIT HI MDM: CPT | Mod: 25

## 2021-05-04 PROCEDURE — 71275 CT ANGIOGRAPHY CHEST: CPT | Mod: ME

## 2021-05-04 PROCEDURE — 96374 THER/PROPH/DIAG INJ IV PUSH: CPT | Mod: 59

## 2021-05-04 PROCEDURE — 84484 ASSAY OF TROPONIN QUANT: CPT | Performed by: EMERGENCY MEDICINE

## 2021-05-04 PROCEDURE — 85025 COMPLETE CBC W/AUTO DIFF WBC: CPT | Performed by: EMERGENCY MEDICINE

## 2021-05-04 PROCEDURE — 120N000001 HC R&B MED SURG/OB

## 2021-05-04 PROCEDURE — C9803 HOPD COVID-19 SPEC COLLECT: HCPCS

## 2021-05-04 PROCEDURE — 82272 OCCULT BLD FECES 1-3 TESTS: CPT | Performed by: PHYSICIAN ASSISTANT

## 2021-05-04 PROCEDURE — 83550 IRON BINDING TEST: CPT | Performed by: EMERGENCY MEDICINE

## 2021-05-04 PROCEDURE — 82728 ASSAY OF FERRITIN: CPT | Performed by: EMERGENCY MEDICINE

## 2021-05-04 PROCEDURE — 86850 RBC ANTIBODY SCREEN: CPT | Performed by: EMERGENCY MEDICINE

## 2021-05-04 PROCEDURE — 93005 ELECTROCARDIOGRAM TRACING: CPT

## 2021-05-04 PROCEDURE — 250N000011 HC RX IP 250 OP 636: Performed by: PHYSICIAN ASSISTANT

## 2021-05-04 PROCEDURE — 36415 COLL VENOUS BLD VENIPUNCTURE: CPT | Performed by: INTERNAL MEDICINE

## 2021-05-04 PROCEDURE — 85060 BLOOD SMEAR INTERPRETATION: CPT | Performed by: PATHOLOGY

## 2021-05-04 PROCEDURE — 250N000009 HC RX 250: Performed by: PHYSICIAN ASSISTANT

## 2021-05-04 PROCEDURE — 83540 ASSAY OF IRON: CPT | Performed by: EMERGENCY MEDICINE

## 2021-05-04 PROCEDURE — 86900 BLOOD TYPING SEROLOGIC ABO: CPT | Performed by: EMERGENCY MEDICINE

## 2021-05-04 PROCEDURE — 84132 ASSAY OF SERUM POTASSIUM: CPT | Performed by: INTERNAL MEDICINE

## 2021-05-04 PROCEDURE — 86923 COMPATIBILITY TEST ELECTRIC: CPT | Performed by: EMERGENCY MEDICINE

## 2021-05-04 PROCEDURE — 85045 AUTOMATED RETICULOCYTE COUNT: CPT | Performed by: EMERGENCY MEDICINE

## 2021-05-04 PROCEDURE — 99223 1ST HOSP IP/OBS HIGH 75: CPT | Mod: AI | Performed by: INTERNAL MEDICINE

## 2021-05-04 PROCEDURE — 86901 BLOOD TYPING SEROLOGIC RH(D): CPT | Performed by: EMERGENCY MEDICINE

## 2021-05-04 PROCEDURE — 83735 ASSAY OF MAGNESIUM: CPT | Performed by: INTERNAL MEDICINE

## 2021-05-04 PROCEDURE — 999N001109 HC STATISTIC MORPHOLOGY W/INTERP HISTOLOGY TC 85060: Performed by: INTERNAL MEDICINE

## 2021-05-04 PROCEDURE — 82565 ASSAY OF CREATININE: CPT

## 2021-05-04 PROCEDURE — 87635 SARS-COV-2 COVID-19 AMP PRB: CPT | Performed by: PHYSICIAN ASSISTANT

## 2021-05-04 RX ORDER — HYDRALAZINE HYDROCHLORIDE 20 MG/ML
10 INJECTION INTRAMUSCULAR; INTRAVENOUS EVERY 4 HOURS PRN
Status: DISCONTINUED | OUTPATIENT
Start: 2021-05-04 | End: 2021-05-06 | Stop reason: HOSPADM

## 2021-05-04 RX ORDER — FUROSEMIDE 10 MG/ML
40 INJECTION INTRAMUSCULAR; INTRAVENOUS ONCE
Status: COMPLETED | OUTPATIENT
Start: 2021-05-04 | End: 2021-05-04

## 2021-05-04 RX ORDER — FUROSEMIDE 10 MG/ML
20 INJECTION INTRAMUSCULAR; INTRAVENOUS ONCE
Status: COMPLETED | OUTPATIENT
Start: 2021-05-04 | End: 2021-05-05

## 2021-05-04 RX ORDER — ONDANSETRON 4 MG/1
4 TABLET, ORALLY DISINTEGRATING ORAL EVERY 6 HOURS PRN
Status: DISCONTINUED | OUTPATIENT
Start: 2021-05-04 | End: 2021-05-06 | Stop reason: HOSPADM

## 2021-05-04 RX ORDER — IOPAMIDOL 755 MG/ML
500 INJECTION, SOLUTION INTRAVASCULAR ONCE
Status: COMPLETED | OUTPATIENT
Start: 2021-05-04 | End: 2021-05-04

## 2021-05-04 RX ORDER — ONDANSETRON 2 MG/ML
4 INJECTION INTRAMUSCULAR; INTRAVENOUS EVERY 6 HOURS PRN
Status: DISCONTINUED | OUTPATIENT
Start: 2021-05-04 | End: 2021-05-06 | Stop reason: HOSPADM

## 2021-05-04 RX ORDER — DEXTROSE MONOHYDRATE 25 G/50ML
25-50 INJECTION, SOLUTION INTRAVENOUS
Status: DISCONTINUED | OUTPATIENT
Start: 2021-05-04 | End: 2021-05-06 | Stop reason: HOSPADM

## 2021-05-04 RX ORDER — NICOTINE POLACRILEX 4 MG
15-30 LOZENGE BUCCAL
Status: DISCONTINUED | OUTPATIENT
Start: 2021-05-04 | End: 2021-05-06 | Stop reason: HOSPADM

## 2021-05-04 RX ORDER — AMOXICILLIN 250 MG
2 CAPSULE ORAL 2 TIMES DAILY PRN
Status: DISCONTINUED | OUTPATIENT
Start: 2021-05-04 | End: 2021-05-06 | Stop reason: HOSPADM

## 2021-05-04 RX ORDER — ACETAMINOPHEN 325 MG/1
650 TABLET ORAL EVERY 4 HOURS PRN
Status: DISCONTINUED | OUTPATIENT
Start: 2021-05-04 | End: 2021-05-06 | Stop reason: HOSPADM

## 2021-05-04 RX ORDER — LABETALOL HYDROCHLORIDE 5 MG/ML
20 INJECTION, SOLUTION INTRAVENOUS EVERY 4 HOURS PRN
Status: DISCONTINUED | OUTPATIENT
Start: 2021-05-04 | End: 2021-05-06 | Stop reason: HOSPADM

## 2021-05-04 RX ORDER — LIDOCAINE 40 MG/G
CREAM TOPICAL
Status: DISCONTINUED | OUTPATIENT
Start: 2021-05-04 | End: 2021-05-06 | Stop reason: HOSPADM

## 2021-05-04 RX ORDER — AMOXICILLIN 250 MG
1 CAPSULE ORAL 2 TIMES DAILY PRN
Status: DISCONTINUED | OUTPATIENT
Start: 2021-05-04 | End: 2021-05-06 | Stop reason: HOSPADM

## 2021-05-04 RX ADMIN — SODIUM CHLORIDE 87 ML: 9 INJECTION, SOLUTION INTRAVENOUS at 18:59

## 2021-05-04 RX ADMIN — IOPAMIDOL 66 ML: 755 INJECTION, SOLUTION INTRAVENOUS at 18:51

## 2021-05-04 RX ADMIN — FUROSEMIDE 40 MG: 10 INJECTION, SOLUTION INTRAMUSCULAR; INTRAVENOUS at 21:18

## 2021-05-04 ASSESSMENT — ENCOUNTER SYMPTOMS
BLOOD IN STOOL: 0
FEVER: 0
NAUSEA: 0
RHINORRHEA: 0
VOMITING: 0
COUGH: 0
SHORTNESS OF BREATH: 1
DIARRHEA: 0
CHILLS: 0

## 2021-05-04 NOTE — ED PROVIDER NOTES
History   Chief Complaint:  Abnormal Labs     HPI   Heather Martin is a 75 year old female with history of MI, Atrial fibrillation, hypertension, CABG and diabetes who presents with abnormal labs. The patient reports that she had a routine checkup for her A1C with her primary care provider today. Her hemoglobin level was low at this appointment (7.2). She has experienced shortness of breath with exertion for the last three days, so the provider recommended that she visit the ED for further care. The patient denies chest pain, fever, chills, cough, rhinorrhea, abdominal pain, nausea, vomiting, diarrhea, or black/bloody stools.    Echo 6.27.2017  CONCLUSIONS  Normal left ventricular size and global and regional function.  Normal left ventricular wall thickness.  Left ventricular ejection fraction is visually estimated at 60%.  Normal right ventricle size and normal global function.  Estimated pulmonary artery systolic pressure is 38 mm Hg plus right  atrial pressure.      Review of Systems   Constitutional: Negative for chills and fever.   HENT: Negative for rhinorrhea.    Respiratory: Positive for shortness of breath. Negative for cough.    Cardiovascular: Negative for chest pain.   Gastrointestinal: Negative for blood in stool, diarrhea, nausea and vomiting.   All other systems reviewed and are negative.      Allergies:  No known allergies    Medications:  Proair  Amlodipine  Atorvastatin  Allegra  Lasix  Metformin  Metoprolol    Past Medical History:    Diabetes  Heart disease  Hypertension  Thyroid disease  Acute cholecystitis  Acute biliary pancreatitis   Hyperthyroidism  Neoplasm of thyroid  Peripheral edema  CAD  Paroxysmal atrial fibrillation    Past Surgical History:    CABG  Total right knee arthroplasty  Right total hip arthroplasty  Cholecystectomy    Family History:    Cholecystitis, father  Stroke, father  Lymphoma, mother    Social History:  Accompanied by a friend  Visits from a routine check-up  today    Physical Exam     Patient Vitals for the past 24 hrs:   BP Temp Temp src Pulse Resp SpO2   05/04/21 2115 (!) 201/80 -- -- -- -- --   05/04/21 2100 -- -- -- -- -- 95 %   05/04/21 2045 -- -- -- 70 -- 97 %   05/04/21 2030 (!) 204/79 -- -- 69 -- 95 %   05/04/21 2015 (!) 191/66 -- -- 66 -- 94 %   05/04/21 2000 (!) 193/80 -- -- 68 -- 94 %   05/04/21 1945 (!) 201/73 -- -- 71 -- 96 %   05/04/21 1845 -- -- -- 66 21 97 %   05/04/21 1830 (!) 198/75 -- -- 68 15 98 %   05/04/21 1815 (!) 188/75 -- -- 67 26 99 %   05/04/21 1800 (!) 194/70 -- -- 68 20 99 %   05/04/21 1745 (!) 194/69 -- -- -- -- --   05/04/21 1439 (!) 165/51 98.6  F (37  C) Temporal 64 18 99 %       Physical Exam  Constitutional: Pleasant. Cooperative.   Eyes: Pupils equally round and reactive  HENT: Head is normal in appearance. Oropharynx is normal with moist mucus membranes.  Cardiovascular: Regular rate and rhythm and without murmurs.  Respiratory: Normal respiratory effort, lungs are clear bilaterally.  GI: Abdomen is soft, non-tender, non-distended. No guarding, rebound, or rigidity.  Musculoskeletal: No asymmetry of the lower extremities, 1+ pitting edema bilaterally.  Skin: Normal, without rash.  Neurologic: Cranial nerves grossly intact, normal cognition, no focal deficits. Alert and oriented x 3.   Psychiatric: Normal affect.  Rectal: Brown stool noted. No hemorrhoids. No fissure.  Nursing notes and vital signs reviewed.      Emergency Department Course   ECG  ECG taken at 1744, ECG read at 1755  Normal sinus rhythm  Possible left atrial enlargement  ST&T wave abnormality, consider anterolateral ischemia  Abnormal ECG   Rate 71 bpm. OH interval 162 ms. QRS duration 88 ms. QT/QTc 410/445 ms. P-R-T axes 60 80 132.     Imaging:  CT Chest Pulmonary Embolism w Contrast   Final Result   IMPRESSION:   1.  Allowing for motion artifact, there is no definitive evidence for pulmonary embolism.      2.  Normal caliber thoracic aorta without dissection.       3.  Well-circumscribed solid noncalcified pulmonary nodule in the anteromedial right lower lobe with mean diameter of 1 cm. Management recommendations as detailed below.      4.  Interlobular septal thickening with minimal bilateral pleural fluid collections and mosaic attenuation. Findings can be seen with volume overload.      5.  Mild bronchial wall thickening diffusely which can be seen with bronchial inflammation/bronchitis.      Fleischner Society Recommendations for Pulmonary Nodules      Nodule size greater than 8 mm:      Consider CT, PET/CT, or tissue sampling at three months         [Recommend Follow Up: Lung nodule]      This report will be copied to the [facility] to ensure a provider acknowledges the finding.            Imaging from [facility] 05/04/2021:  XR Chest 2 Views   Heart is mildly enlarged with sternal wires present. There are interstitial infiltrates at the lung bases with redistribution pulmonary vasculature consistent with mild pulmonary vascular congestion.     Read per radiology    Laboratory:  Creatinine POCT: Creatinine 0.9, GFR 61     CBC: WBC 6.5, HGB 7.2 (L),     ABO RH Type and Screen: AB, antibody neg    Troponin (Collected 1731): 0.019    Symptomatic Influenza A/B & SARS-CoV2 (COVID19) Virus PCR Multiplex: In process     Stool occult blood: negative    Collected at [facility] 05/04/2021  CBC: WBC 5.3, HGB 7.2 (L),    TSH: 0.03 (L)  T4: 0.80 (WNL)  BMP: K 3.2 (L), glucose 148 (H), otherwise WNL (Cr 0.80)  BNP: 1586 (H)  D dimer: 1.21 (H)    Emergency Department Course:  Reviewed:  I reviewed nursing notes, vitals, past medical history and care everywhere    Assessments:  1742 I obtained history and examined the patient as noted above.   2034 I rechecked the patient and explained findings. All questions are answered and the patient is ready to be admitted to the hospital.      Consults:   2053 I consulted with Dr. Lucia MD, the admitting  hospitalist    Interventions:  2118 Lasix 40 mg IV    Disposition:  The patient was admitted to the hospital under the care of Dr. Myers.     Impression & Plan     Medical Decision Making:  Heather Martin is a 75 year old female who presents to the ED for evaluation of dyspnea on exertion.  Patient was seen by her PCP today and noted to be anemic with a hemoglobin of 7.2, and thus was advised to present to the ED for further evaluation.  See HPI as above for additional details.  Vitals and physical exam as above.  Differential for dyspnea was broad and included ACS, CHF, pneumonia, PE, pneumothorax, anemia, among others.  Work-up obtained as above.  Lab work obtained in clinic notable for D-dimer elevated at 1.21, BNP elevated at 1586, as well as a hemoglobin at 7.2.  See remainder of clinic lab work above.  Lab work obtained here in the ED confirms hemoglobin of 7.2 as well as troponin at 0.019.  Given elevated D-dimer, CT PE obtained as above with evidence for pleural effusions without evidence for PE. Suspect patient's symptoms are secondary to both anemia as well as some degree of CHF, given evidence for mild elevation of BNP, lower extremity edema, as well as pleural fluid collections noted on CT. Patient denies CHF history. Lasix initiated here in the ED. Will defer on transfusion in the ED given HGB at 7.2. Lower suspicion for ACS at this time. Overall, further work up indicated, and discussed with patient indication for admission. Patient in agreement for admission. Discussed the case with the hospitalist, who agreed to admit the patient. All questions answered. Patient admitted in stable condition.    Covid-19  Heather Martin was evaluated during a global COVID-19 pandemic, which necessitated consideration that the patient might be at risk for infection with the SARS-CoV-2 virus that causes COVID-19.   Applicable protocols for evaluation were followed during the patient's care.   COVID-19 was  considered as part of the patient's evaluation. The plan for testing is:  a test was obtained during this visit.    Diagnosis:    ICD-10-CM    1. Dyspnea on exertion  R06.00 Stool: occult blood     Symptomatic SARS-CoV-2 COVID-19 Virus (Coronavirus) by PCR     Iron and iron binding capacity     Iron and iron binding capacity     Ferritin     Ferritin     Reticulocyte count     Reticulocyte count     CANCELED: Ferritin     CANCELED: Iron and iron binding capacity     CANCELED: Reticulocyte count   2. Anemia  D64.9    3. Elevated brain natriuretic peptide (BNP) level  R79.89    4. Pleural effusion  J90        Scribe Disclosure:  I, Vikash Pritchard, am serving as a scribe at 5:44 PM on 5/4/2021 to document services personally performed by Tremaine Crane PA-C based on my observations and the provider's statements to me.     This record was created at least in part using electronic voice recognition software, so please excuse any typographical errors.             Tremaine Crane PA-C  05/05/21 0035

## 2021-05-04 NOTE — ED TRIAGE NOTES
Patient is referred by PCP for Hgb 7.2. Patient was seen for a scheduled check up and did some basic lab work. Patient notes she has been slightly more short of breath the last 3 days. Patient is not in distress.

## 2021-05-05 ENCOUNTER — APPOINTMENT (OUTPATIENT)
Dept: CARDIOLOGY | Facility: CLINIC | Age: 76
DRG: 293 | End: 2021-05-05
Attending: INTERNAL MEDICINE
Payer: MEDICARE

## 2021-05-05 LAB
ANION GAP SERPL CALCULATED.3IONS-SCNC: 8 MMOL/L (ref 3–14)
BLD PROD TYP BPU: NORMAL
BLD UNIT ID BPU: 0
BLOOD PRODUCT CODE: NORMAL
BPU ID: NORMAL
BUN SERPL-MCNC: 17 MG/DL (ref 7–30)
CALCIUM SERPL-MCNC: 8.6 MG/DL (ref 8.5–10.1)
CHLORIDE SERPL-SCNC: 105 MMOL/L (ref 94–109)
CO2 SERPL-SCNC: 27 MMOL/L (ref 20–32)
CREAT SERPL-MCNC: 0.66 MG/DL (ref 0.52–1.04)
ERYTHROCYTE [DISTWIDTH] IN BLOOD BY AUTOMATED COUNT: 20.1 % (ref 10–15)
GFR SERPL CREATININE-BSD FRML MDRD: 86 ML/MIN/{1.73_M2}
GLUCOSE BLDC GLUCOMTR-MCNC: 115 MG/DL (ref 70–99)
GLUCOSE BLDC GLUCOMTR-MCNC: 115 MG/DL (ref 70–99)
GLUCOSE BLDC GLUCOMTR-MCNC: 141 MG/DL (ref 70–99)
GLUCOSE BLDC GLUCOMTR-MCNC: 150 MG/DL (ref 70–99)
GLUCOSE BLDC GLUCOMTR-MCNC: 151 MG/DL (ref 70–99)
GLUCOSE BLDC GLUCOMTR-MCNC: 158 MG/DL (ref 70–99)
GLUCOSE SERPL-MCNC: 106 MG/DL (ref 70–99)
HCT VFR BLD AUTO: 30.6 % (ref 35–47)
HGB BLD-MCNC: 8.8 G/DL (ref 11.7–15.7)
HGB BLD-MCNC: 8.8 G/DL (ref 11.7–15.7)
INTERPRETATION ECG - MUSE: NORMAL
MAGNESIUM SERPL-MCNC: 1.4 MG/DL (ref 1.6–2.3)
MCH RBC QN AUTO: 23 PG (ref 26.5–33)
MCHC RBC AUTO-ENTMCNC: 28.8 G/DL (ref 31.5–36.5)
MCV RBC AUTO: 80 FL (ref 78–100)
PLATELET # BLD AUTO: 305 10E9/L (ref 150–450)
POTASSIUM SERPL-SCNC: 2.8 MMOL/L (ref 3.4–5.3)
POTASSIUM SERPL-SCNC: 3 MMOL/L (ref 3.4–5.3)
POTASSIUM SERPL-SCNC: 3.3 MMOL/L (ref 3.4–5.3)
POTASSIUM SERPL-SCNC: 3.4 MMOL/L (ref 3.4–5.3)
RBC # BLD AUTO: 3.83 10E12/L (ref 3.8–5.2)
SODIUM SERPL-SCNC: 140 MMOL/L (ref 133–144)
T4 FREE SERPL-MCNC: 0.96 NG/DL (ref 0.76–1.46)
TRANSFUSION STATUS PATIENT QL: NORMAL
TRANSFUSION STATUS PATIENT QL: NORMAL
TROPONIN I SERPL-MCNC: 0.02 UG/L (ref 0–0.04)
TSH SERPL DL<=0.005 MIU/L-ACNC: 0.03 MU/L (ref 0.4–4)
WBC # BLD AUTO: 7 10E9/L (ref 4–11)

## 2021-05-05 PROCEDURE — 250N000011 HC RX IP 250 OP 636: Performed by: INTERNAL MEDICINE

## 2021-05-05 PROCEDURE — 250N000012 HC RX MED GY IP 250 OP 636 PS 637: Performed by: INTERNAL MEDICINE

## 2021-05-05 PROCEDURE — 84443 ASSAY THYROID STIM HORMONE: CPT | Performed by: HOSPITALIST

## 2021-05-05 PROCEDURE — 120N000001 HC R&B MED SURG/OB

## 2021-05-05 PROCEDURE — C9113 INJ PANTOPRAZOLE SODIUM, VIA: HCPCS | Performed by: INTERNAL MEDICINE

## 2021-05-05 PROCEDURE — 84132 ASSAY OF SERUM POTASSIUM: CPT | Performed by: HOSPITALIST

## 2021-05-05 PROCEDURE — 36415 COLL VENOUS BLD VENIPUNCTURE: CPT | Performed by: HOSPITALIST

## 2021-05-05 PROCEDURE — 93306 TTE W/DOPPLER COMPLETE: CPT

## 2021-05-05 PROCEDURE — P9016 RBC LEUKOCYTES REDUCED: HCPCS | Performed by: EMERGENCY MEDICINE

## 2021-05-05 PROCEDURE — 84439 ASSAY OF FREE THYROXINE: CPT | Performed by: HOSPITALIST

## 2021-05-05 PROCEDURE — 85027 COMPLETE CBC AUTOMATED: CPT | Performed by: HOSPITALIST

## 2021-05-05 PROCEDURE — 250N000013 HC RX MED GY IP 250 OP 250 PS 637: Performed by: HOSPITALIST

## 2021-05-05 PROCEDURE — 85018 HEMOGLOBIN: CPT | Performed by: HOSPITALIST

## 2021-05-05 PROCEDURE — 99233 SBSQ HOSP IP/OBS HIGH 50: CPT | Performed by: HOSPITALIST

## 2021-05-05 PROCEDURE — 93306 TTE W/DOPPLER COMPLETE: CPT | Mod: 26 | Performed by: INTERNAL MEDICINE

## 2021-05-05 PROCEDURE — 999N001017 HC STATISTIC GLUCOSE BY METER IP

## 2021-05-05 PROCEDURE — 84484 ASSAY OF TROPONIN QUANT: CPT | Performed by: HOSPITALIST

## 2021-05-05 PROCEDURE — 80048 BASIC METABOLIC PNL TOTAL CA: CPT | Performed by: HOSPITALIST

## 2021-05-05 RX ORDER — POTASSIUM CHLORIDE 1500 MG/1
40 TABLET, EXTENDED RELEASE ORAL ONCE
Status: COMPLETED | OUTPATIENT
Start: 2021-05-05 | End: 2021-05-05

## 2021-05-05 RX ORDER — METHIMAZOLE 5 MG/1
5 TABLET ORAL DAILY
Status: DISCONTINUED | OUTPATIENT
Start: 2021-05-06 | End: 2021-05-06 | Stop reason: HOSPADM

## 2021-05-05 RX ORDER — HYDROCHLOROTHIAZIDE 12.5 MG/1
12.5 CAPSULE ORAL DAILY
Status: DISCONTINUED | OUTPATIENT
Start: 2021-05-05 | End: 2021-05-06 | Stop reason: HOSPADM

## 2021-05-05 RX ORDER — CETIRIZINE HYDROCHLORIDE, PSEUDOEPHEDRINE HYDROCHLORIDE 5; 120 MG/1; MG/1
1 TABLET, FILM COATED, EXTENDED RELEASE ORAL DAILY PRN
Status: DISCONTINUED | OUTPATIENT
Start: 2021-05-05 | End: 2021-05-05 | Stop reason: CLARIF

## 2021-05-05 RX ORDER — VITAMIN B COMPLEX
50 TABLET ORAL EVERY MORNING
Status: DISCONTINUED | OUTPATIENT
Start: 2021-05-05 | End: 2021-05-06 | Stop reason: HOSPADM

## 2021-05-05 RX ORDER — METOPROLOL SUCCINATE 100 MG/1
100 TABLET, EXTENDED RELEASE ORAL DAILY
Status: DISCONTINUED | OUTPATIENT
Start: 2021-05-05 | End: 2021-05-06 | Stop reason: HOSPADM

## 2021-05-05 RX ORDER — AZELASTINE 1 MG/ML
1 SPRAY, METERED NASAL 2 TIMES DAILY
COMMUNITY

## 2021-05-05 RX ORDER — CETIRIZINE HYDROCHLORIDE 5 MG/1
5 TABLET ORAL DAILY PRN
Status: DISCONTINUED | OUTPATIENT
Start: 2021-05-05 | End: 2021-05-06 | Stop reason: HOSPADM

## 2021-05-05 RX ORDER — METHIMAZOLE 5 MG/1
10 TABLET ORAL DAILY
Status: DISCONTINUED | OUTPATIENT
Start: 2021-05-06 | End: 2021-05-05

## 2021-05-05 RX ORDER — MAGNESIUM OXIDE 400 MG/1
400 TABLET ORAL 2 TIMES DAILY
Status: DISCONTINUED | OUTPATIENT
Start: 2021-05-05 | End: 2021-05-06 | Stop reason: HOSPADM

## 2021-05-05 RX ORDER — SERTRALINE HYDROCHLORIDE 25 MG/1
25 TABLET, FILM COATED ORAL DAILY
Status: DISCONTINUED | OUTPATIENT
Start: 2021-05-05 | End: 2021-05-06 | Stop reason: HOSPADM

## 2021-05-05 RX ORDER — AMLODIPINE BESYLATE 2.5 MG/1
2.5 TABLET ORAL DAILY
Status: DISCONTINUED | OUTPATIENT
Start: 2021-05-05 | End: 2021-05-06 | Stop reason: HOSPADM

## 2021-05-05 RX ORDER — SERTRALINE HYDROCHLORIDE 25 MG/1
25 TABLET, FILM COATED ORAL DAILY
COMMUNITY

## 2021-05-05 RX ORDER — ACETAMINOPHEN 500 MG
1000 TABLET ORAL AT BEDTIME
COMMUNITY

## 2021-05-05 RX ORDER — LISINOPRIL/HYDROCHLOROTHIAZIDE 10-12.5 MG
1 TABLET ORAL DAILY
Status: DISCONTINUED | OUTPATIENT
Start: 2021-05-05 | End: 2021-05-05 | Stop reason: CLARIF

## 2021-05-05 RX ORDER — ASPIRIN 81 MG/1
81 TABLET ORAL AT BEDTIME
Status: DISCONTINUED | OUTPATIENT
Start: 2021-05-05 | End: 2021-05-06 | Stop reason: HOSPADM

## 2021-05-05 RX ORDER — POTASSIUM CHLORIDE 1.5 G/1.58G
40 POWDER, FOR SOLUTION ORAL ONCE
Status: DISCONTINUED | OUTPATIENT
Start: 2021-05-05 | End: 2021-05-05

## 2021-05-05 RX ORDER — ATORVASTATIN CALCIUM 40 MG/1
80 TABLET, FILM COATED ORAL AT BEDTIME
Status: DISCONTINUED | OUTPATIENT
Start: 2021-05-05 | End: 2021-05-06 | Stop reason: HOSPADM

## 2021-05-05 RX ORDER — CETIRIZINE HYDROCHLORIDE, PSEUDOEPHEDRINE HYDROCHLORIDE 5; 120 MG/1; MG/1
1 TABLET, FILM COATED, EXTENDED RELEASE ORAL DAILY PRN
COMMUNITY

## 2021-05-05 RX ORDER — OLOPATADINE HYDROCHLORIDE 1 MG/ML
1 SOLUTION/ DROPS OPHTHALMIC 2 TIMES DAILY
Status: DISCONTINUED | OUTPATIENT
Start: 2021-05-05 | End: 2021-05-06 | Stop reason: HOSPADM

## 2021-05-05 RX ORDER — POTASSIUM CHLORIDE 1500 MG/1
20 TABLET, EXTENDED RELEASE ORAL ONCE
Status: COMPLETED | OUTPATIENT
Start: 2021-05-05 | End: 2021-05-05

## 2021-05-05 RX ORDER — METHIMAZOLE 5 MG/1
5 TABLET ORAL DAILY
COMMUNITY

## 2021-05-05 RX ORDER — MULTIPLE VITAMINS W/ MINERALS TAB 9MG-400MCG
1 TAB ORAL DAILY
COMMUNITY

## 2021-05-05 RX ORDER — ALBUTEROL SULFATE 90 UG/1
1-2 AEROSOL, METERED RESPIRATORY (INHALATION) EVERY 4 HOURS PRN
Status: DISCONTINUED | OUTPATIENT
Start: 2021-05-05 | End: 2021-05-06 | Stop reason: HOSPADM

## 2021-05-05 RX ORDER — METHIMAZOLE 5 MG/1
5 TABLET ORAL DAILY
Status: DISCONTINUED | OUTPATIENT
Start: 2021-05-05 | End: 2021-05-05

## 2021-05-05 RX ORDER — PSEUDOEPHEDRINE HCL 120 MG/1
120 TABLET, FILM COATED, EXTENDED RELEASE ORAL DAILY PRN
Status: DISCONTINUED | OUTPATIENT
Start: 2021-05-05 | End: 2021-05-06 | Stop reason: HOSPADM

## 2021-05-05 RX ORDER — MULTIPLE VITAMINS W/ MINERALS TAB 9MG-400MCG
1 TAB ORAL DAILY
Status: DISCONTINUED | OUTPATIENT
Start: 2021-05-05 | End: 2021-05-06 | Stop reason: HOSPADM

## 2021-05-05 RX ORDER — LISINOPRIL 10 MG/1
10 TABLET ORAL DAILY
Status: DISCONTINUED | OUTPATIENT
Start: 2021-05-05 | End: 2021-05-06 | Stop reason: HOSPADM

## 2021-05-05 RX ADMIN — PANTOPRAZOLE SODIUM 40 MG: 40 INJECTION, POWDER, FOR SOLUTION INTRAVENOUS at 08:30

## 2021-05-05 RX ADMIN — LISINOPRIL 10 MG: 10 TABLET ORAL at 11:40

## 2021-05-05 RX ADMIN — ASPIRIN 81 MG: 81 TABLET ORAL at 20:17

## 2021-05-05 RX ADMIN — INSULIN ASPART 1 UNITS: 100 INJECTION, SOLUTION INTRAVENOUS; SUBCUTANEOUS at 18:14

## 2021-05-05 RX ADMIN — HYDRALAZINE HYDROCHLORIDE 10 MG: 20 INJECTION INTRAMUSCULAR; INTRAVENOUS at 03:14

## 2021-05-05 RX ADMIN — Medication 50 MCG: at 11:40

## 2021-05-05 RX ADMIN — POTASSIUM CHLORIDE 40 MEQ: 1500 TABLET, EXTENDED RELEASE ORAL at 09:57

## 2021-05-05 RX ADMIN — INSULIN ASPART 1 UNITS: 100 INJECTION, SOLUTION INTRAVENOUS; SUBCUTANEOUS at 13:19

## 2021-05-05 RX ADMIN — ATORVASTATIN CALCIUM 80 MG: 40 TABLET, FILM COATED ORAL at 20:17

## 2021-05-05 RX ADMIN — OLOPATADINE HYDROCHLORIDE 1 DROP: 1 SOLUTION/ DROPS OPHTHALMIC at 11:41

## 2021-05-05 RX ADMIN — FUROSEMIDE 20 MG: 10 INJECTION, SOLUTION INTRAMUSCULAR; INTRAVENOUS at 00:49

## 2021-05-05 RX ADMIN — METHIMAZOLE 5 MG: 5 TABLET ORAL at 12:22

## 2021-05-05 RX ADMIN — METOPROLOL SUCCINATE 100 MG: 100 TABLET, EXTENDED RELEASE ORAL at 11:40

## 2021-05-05 RX ADMIN — OLOPATADINE HYDROCHLORIDE 1 DROP: 1 SOLUTION/ DROPS OPHTHALMIC at 20:22

## 2021-05-05 RX ADMIN — MAGNESIUM OXIDE TAB 400 MG (241.3 MG ELEMENTAL MG) 400 MG: 400 (241.3 MG) TAB at 09:57

## 2021-05-05 RX ADMIN — Medication 1 TABLET: at 11:40

## 2021-05-05 RX ADMIN — MULTIPLE VITAMINS W/ MINERALS TAB 1 TABLET: TAB at 11:40

## 2021-05-05 RX ADMIN — HYDROCHLOROTHIAZIDE 12.5 MG: 12.5 CAPSULE ORAL at 11:40

## 2021-05-05 RX ADMIN — SERTRALINE HYDROCHLORIDE 25 MG: 25 TABLET ORAL at 11:40

## 2021-05-05 RX ADMIN — AMLODIPINE BESYLATE 2.5 MG: 2.5 TABLET ORAL at 11:40

## 2021-05-05 RX ADMIN — POTASSIUM CHLORIDE 20 MEQ: 1500 TABLET, EXTENDED RELEASE ORAL at 14:44

## 2021-05-05 RX ADMIN — MAGNESIUM OXIDE TAB 400 MG (241.3 MG ELEMENTAL MG) 400 MG: 400 (241.3 MG) TAB at 20:17

## 2021-05-05 ASSESSMENT — ACTIVITIES OF DAILY LIVING (ADL)
ADLS_ACUITY_SCORE: 14
ADLS_ACUITY_SCORE: 16
ADLS_ACUITY_SCORE: 14

## 2021-05-05 ASSESSMENT — MIFFLIN-ST. JEOR: SCORE: 1021.59

## 2021-05-05 NOTE — H&P
[facility]  Hospitalist Admission Note  Name: Heather Martin    MRN: [id number]  YOB: 1945    Age: 75 year old  Date of admission: 5/4/2021  Primary care provider: Clinic, [facility]    Chief Complaint: dyspnea on exertion, anemia    Heather Martin is a 75 year old female with PMH including CAD with CABG in 2017, hypertension, type 2 diabetes, functioning thyroid nodules with history of thyroid lobectomy on methimazole, atrial fibrillation who presents with dyspnea on exertion found to have new anemia.  She was seen in her family medicine clinic today and reported shortness of breath for about the past 3 days which prompted further evaluation.  Labs eventually returned showing a hemoglobin of 7.2 with no recent baseline available but prior values over a year ago showing hemoglobin in the 10-11 range.  She was also found to have suppressed TSH with elevated T4 and elevated D-dimer.  EKG reportedly showed sinus rhythm with borderline bradycardia with a rate of 59.  Chest x-ray was suggestive of interstitial infiltrates with redistribution of pulmonary vasculature consistent with mild pulmonary vascular congestion.     She was contacted and asked to come to the emergency room for further evaluation, particularly given her degree of anemia.    Here in the emergency room her blood pressure was extremely elevated in the 190-200 range.  Hemoglobin was rechecked and again found to be 7.2.  Troponin was detectable at 0.019.  COVID-19 swab is pending.  she underwent CT chest PE protocol here which was negative for PE but did show pulmonary nodule, evidence of some bronchitis  I am asked to admit her for further care in the meantime.    At this point she will be transfused 1 unit red cells and given a dose of Lasix as I have some concern for mild CHF symptoms and she does have fluid overload on exam.  We will plan to restart her home blood pressure medications and I am  increasing her methimazole.  The combination of very elevated blood pressure and hyperactive thyroid tests may be related to either from noncompliance with medications or elevated blood pressure related to uncontrolled hyperthyroidism.    Assessment and Plan:   1. Dyspnea on exertion: Suspect this is related to symptomatic anemia and possibly acute on chronic congestive heart failure.  Other considerations include anginal/ACS equivalent, viral process such as COVID-19 or bronchitis.  Currently her blood pressure is extremely elevated which could be causing some dyspnea and potentially worsening her heart failure.  --Admit under inpatient status  --Transfuse 1 unit red cells, give 20 mg IV Lasix prior to this  --Monitor on telemetry, pulse oximetry  --Await COVID-19 swab    2.   Anemia: She carries a diagnosis of iron deficiency anemia.  We will recheck iron studies prior to transfusion as well as a peripheral smear.  Transfusing 1 unit red cells as noted above.  Will recheck in the morning.  No report of anything suggestive of brisk GI bleeding but certainly this raises the question of occult chronic GI blood loss which she will need to have worked up further at some point.  --Transfuse 1 unit red cells, recheck hemoglobin with a.m. labs  --Monitor for any evidence of blood loss here  --Await iron studies and peripheral smear  --May require outpatient endoscopy.  She reports her last colonoscopy was in the 1990s sometime.  --Empiric IV Protonix at least for now, likely can stop in the next day if no evidence of GI bleeding is noted.  --Hold aspirin and some blood pressure medications as noted below pending hemoglobin recheck.    3.   History of nodules of the thyroid, prior right lobectomy in July 2020 goiter.  apparent hyper-thyroidism currently: Was restarted on methimazole late February 2021 due to hyperactive range thyroid testing at that time.  Note the TSH in her clinic today was quite suppressed on outside  labs today (<0.02) and T4 was elevated at 2.10.  It is difficult to say to what extent this is causing her symptoms.    --Continue methimazole but increase from 5 mg to 10 mg daily  --she should have short term follow up with her endocrinologist.    4.   History of type 2 diabetes on Metformin: Hold Metformin in favor of sliding scale insulin for now.    5.   CAD with prior CABG in 2017, accelerated hypertension: Blood pressure in the 180s to 200s systolic in the ER.  See above.  --Resume home Toprol- mg daily, lisinopril and HCTZ.    --she also is chronically on atorvastatin 80 mg daily.    --Hold aspirin 81 mg pending recheck hemoglobin.  --We will have as needed IV hydralazine and labetalol available as well.    6.  History of atrial fibrillation: Not on anticoagulation  Continue oral metoprolol succinate 100 mg daily and hold aspirin as above.  Sinus rhythm.    7.      Incidental finding of a well-circumscribed solid noncalcified pulmonary nodule in the anteromedial right lower lobe with mean diameter of 1 cm.   --Follow-up in clinic     8.  History of lower extremity edema: Per outside records she is supposed to take Lasix at least periodically.  When I asked her about this she indicated she had not been taking it at all.  --Giving a dose of IV Lasix this evening prior to blood, revisit tomorrow.  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  She was seen in her family medicine clinic today and reported shortness of breath for about the past 3 days which prompted further evaluation.  Labs  eventually returned showing a hemoglobin of 7.2 with no recent baseline available but prior values over a year ago showing hemoglobin in the 10-11 range.  She was also found to have suppressed TSH with elevated T4.  EKG reportedly showed sinus rhythm with borderline bradycardia with a rate of 59.  Chest x-ray was suggestive of interstitial infiltrates with redistribution of pulmonary vasculature consistent with mild pulmonary vascular congestion.     She was contacted and asked to come to the emergency room for further evaluation, particularly given her degree of anemia.         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  CABG 1/1/17     Postoperative anemia due to acute blood loss 04/09/2014   Nontoxic multinodular goiter 12/17/2008   Overview:     Formatting of this note might be different from the original.  Normal thyroid function tests. Had surgical consult 9/2013 for enlarging   goiter. Planning surgery in 2014 or 2015 after total knee and total hip   replacements.  ; Goiter Multinodular Nontoxic     Type 2 diabetes mellitus, controlled 10/23/2008   Overview:     Formatting of this note might be different from the original.  DM Type2     Essential hypertension 06/07/2003   Overview:     Formatting of this  note might be different from the original.  Hypertension     Hyperlipidemia 06/07/2003   Osteoarthritis 06/07/2003   Overview:     Formatting of this note might be different from the original.  DJD - right hip      Allergic rhinitis 06/07/2003   Overview:     Formatting of this note might be different from the original.  Rhinitis Allergic NOS     Obesity        Surgical History    Surgery Date Site/Laterality Comments   HX APPENDECTOMY          TONSILLECTOMY          TOTAL KNEE ARTHROPLASTY 4/1/2014 - 4/30/2014 Bilateral      TOTAL HIP ARTHROPLASTY 6/1/2015 - 6/30/2015 Right hip     CORONARY ARTERY BYPASS GRAFT 1/1/17   x2     CHOLECYSTECTOMY 08/29/2017        THYROID LOBECTOMY 07/20/2020 Right Dr. Dominguez       Family History    Medical History Relation Name Comments   Stroke Birth Father       cva Birth Father       High Blood Pressure Birth Mother       cancer esoph Birth Mother       Heart Disease Brother       High Blood Pressure Brother       Other Brother       Renal Failure Brother       Alcohol Abuse Sister       Dementia Sister       Cancer, Colon Negative Family History       Cancer, Endometrial Negative Family History           Medications  Reconcile with Patient's Chart  Medication Sig Dispensed Refills Start Date End Date Status   cholecalciferol (AKA VITAMIN D3) 2000 UNITS tablet   Take 1 tablet by mouth daily (every 24 hours). 90   3 04/20/2011   Active   aspirin EC 81 MG enteric coated tablet    Indications: antiplatelet therapy Take 1 Tab by mouth daily. Indications: antiplatelet therapy   3 01/07/2017   Active   olopatadine (PATANOL) 0.1 % eye drop solution   Place 1 Drop into both eyes two times a day.   0 01/07/2017   Active   acetaminophen (TYLENOL) 325 MG tablet    Indications: Pain Take 1-2 Tabs by mouth every 4 hours as needed for Pain (1 tab for pain rated 1-5 and 2 tabs for pain rated 6-10). Indications: Pain 300 Tab   3 04/06/2018   Active   amoxicillin (AMOXIL) 500 MG capsule     Indications: Controlled type 2 diabetes mellitus without complication, without long-term current use of insulin (HRC), Status post right hip replacement Take 4 capsules by mouth 1 hour prior to dental work  8 Capsule     12/05/2019   Active   raNITIdine (ZANTAC) 150 MG tablet   Take 150 mg by mouth two times a day.   0     Active   cetirizine/pseudoephedrine (ALL DAY ALLERGY D) 5-120 MG tablet   Take 1 Tablet by mouth daily as needed. 90 Tablet   1 07/08/2020   Active   amLODIPine (NORVASC) 2.5 MG tablet   Take 1 Tablet by mouth daily. 90 Tablet   3 11/10/2020 11/10/2021 Active   azelastine (ASTELIN) 0.1 % nasal solution   Place 1 Spray into both nostrils two times a day. 30 mL   11 11/13/2020   Active   atorvastatin (LIPITOR) 80 MG tablet    Indications: Other hyperlipidemia Take 1 Tablet by mouth daily. 90 Tablet   2 02/03/2021   Active   metoprolol succinate (TOPROL XL) 100 MG 24 hour release tablet    Indications: Essential hypertension (HRC) Take 1 Tablet by mouth daily.  90 Tablet   2 02/03/2021   Active   sertraline (ZOLOFT) 25 MG tablet    Indications: Major depressive disorder with current active episode, unspecified depression episode severity, unspecified whether recurrent (HRC) Take 1 Tablet by mouth daily. 90 Tablet   0 02/22/2021 02/22/2022 Active   methIMAzole (TAPAZOLE) 5 MG tablet   Take 1 Tablet by mouth daily. 90 Tablet   3 02/24/2021   Active   ALBUterol sulfate  (90 Base) MCG/ACT inhaler   Inhale 1-2 Puffs every 4 hours as needed for Wheezing. Indications: Disease Involving Spasms of the Bronchus 3 Each   2 03/08/2021   Active   famotidine (PEPCID) 20 MG tablet   Take 1 Tablet by mouth two times a day. 60 Tablet   3 03/09/2021   Active   metFORMIN (GLUCOPHAGE) 500 MG tablet    Indications: Controlled type 2 diabetes mellitus without complication, without long-term current use of insulin (HRC) Take 1 Tablet by mouth daily with breakfast. 90 Tablet   2 04/05/2021   Active    lisinopril-hydroCHLOROthiazide (PRINZIDE) 20-12.5 MG tablet   Take 1 Tablet by mouth daily. 90 Tablet   3 05/04/2021 05/04/2022 Active   metFORMIN (GLUCOPHAGE) 500 MG tablet    Indications: Controlled type 2 diabetes mellitus without complication, without long-term current use of insulin (HRC) Take 1 Tablet by mouth daily with breakfast. 90 Tablet   2 04/14/2020 04/05/2021 Discontinued   lisinopril-hydrochlorothiazide (PRINZIDE) 10-12.5 MG tablet    Indications: Essential hypertension (HRC) Take 1 Tablet by mouth daily. 90 Tablet   2 02/03/2021 05/04/2021 Discontinued         Review of Systems:  A Comprehensive greater than 10 system review of systems was carried out.  Pertinent positives and negatives are noted above.  Otherwise negative for contributory information.     Physical Exam:  Blood pressure (!) 204/79, pulse 69, temperature 98.6  F (37  C), temperature source Temporal, resp. rate 21, SpO2 95 %.  Wt Readings from Last 1 Encounters:   01/21/21 70.8 kg (156 lb)     Exam:  General: Alert, awake, no acute distress.  Calm and pleasant woman.    HEENT: NC/AT, eyes anicteric, external occular movements intact, face symmetric.    Cardiac: RRR, S1, S2.  No murmurs appreciated.  Pulmonary: Normal chest rise, normal work of breathing.  Lungs CTA BL  Abdomen: soft, non-tender, non-distended.  Bowel Sounds Present.  No guarding.  Extremities: 2+ bilateral lower extremity edema.  Pitting.  No deformities.    Skin: no rashes or lesions noted.  Warm and Dry.  Neuro: No focal deficits noted.  Speech clear.  Coordination and strength grossly normal.  Psych: Nonchalant affect.    Data:  EKG: Sinus rhythm  Imaging:  Results for orders placed or performed during the hospital encounter of 05/04/21   CT Chest Pulmonary Embolism w Contrast     Value    Radiologist flags Lung nodule    Narrative    EXAM: CT CHEST PULMONARY EMBOLISM W CONTRAST  LOCATION: [facility]  DATE/TIME: 5/4/2021 6:49 PM    INDICATION: PE  suspected, low/intermediate probability. Positive D-dimer. Dyspnea.  COMPARISON: None.  TECHNIQUE: CT chest pulmonary angiogram during arterial phase injection of IV contrast. Multiplanar reformats and MIP reconstructions were performed. Dose reduction techniques were used.   CONTRAST: 66 mL Isovue-370.    FINDINGS:  ANGIOGRAM CHEST: Allowing for motion artifact there is no definitive evidence for pulmonary embolism. Normal caliber thoracic aorta without dissection. Moderate aortic calcification.    LUNGS AND PLEURA: Diffuse interlobular septal thickening with mosaic attenuation of the pulmonary parenchyma which can be seen with volume overload. Well-circumscribed solid noncalcified pulmonary nodule anteromedial right lower lobe with mean diameter   of 1 cm (series 8, image 96). Mild bronchial wall thickening.    MEDIASTINUM/AXILLAE: Minimal cardiac enlargement. No lymphadenopathy. Thyroid goiter.    CORONARY ARTERY CALCIFICATION: Previous intervention (stents or CABG).    UPPER ABDOMEN: Unremarkable.    MUSCULOSKELETAL: Minimal degenerative changes thoracic spine.      Impression    IMPRESSION:  1.  Allowing for motion artifact, there is no definitive evidence for pulmonary embolism.    2.  Normal caliber thoracic aorta without dissection.    3.  Well-circumscribed solid noncalcified pulmonary nodule in the anteromedial right lower lobe with mean diameter of 1 cm. Management recommendations as detailed below.    4.  Interlobular septal thickening with minimal bilateral pleural fluid collections and mosaic attenuation. Findings can be seen with volume overload.    5.  Mild bronchial wall thickening diffusely which can be seen with bronchial inflammation/bronchitis.    Fleischner Society Recommendations for Pulmonary Nodules    Nodule size greater than 8 mm:    Consider CT, PET/CT, or tissue sampling at three months      [Recommend Follow Up: Lung nodule]    This report will be copied to the [facility] to  ensure a provider acknowledges the finding.          Labs:  Recent Labs   Lab 05/04/21  1731   WBC 6.5   HGB 7.2*   HCT 25.0*   MCV 79           TSH with Free T4 (if TSH Abnormal) (05/04/2021 11:15 AM CDT)  TSH with Free T4 (if TSH Abnormal) (05/04/2021 11:15 AM CDT)   Component Value Ref Range Performed At Pathologist [facility]   TSH, Reflex 0.03 (L) 0.30 - 4.50 uIU/mL [facility]       TSH with Free T4 (if TSH Abnormal) (05/04/2021 11:15 AM CDT)   Specimen   Blood     TSH with Free T4 (if TSH Abnormal) (05/04/2021 11:15 AM CDT)   Narrative Performed At   Lab will automatically reflex to Free T4 when TSH results are outside of reference range for patient's age group.   [facility]       TSH with Free T4 (if TSH Abnormal) (05/04/2021 11:15 AM CDT)   Performing Organization Address City/[facility]/[place] Code Phone Number   [facility]   [address] [place] [address]        Back to top of Lab Results       B-Type Natriuretic Peptide (BNP) (05/04/2021 11:15 AM CDT)  B-Type Natriuretic Peptide (BNP) (05/04/2021 11:15 AM CDT)   Component Value Ref Range Performed At Pathologist [facility]   B Type Natr. Peptide 1,586 (H) <=99 pg/mL [facility]       B-Type Natriuretic Peptide (BNP) (05/04/2021 11:15 AM CDT)   Specimen   Blood     B-Type Natriuretic Peptide (BNP) (05/04/2021 11:15 AM CDT)   Performing Organization Address City/[facility]/ZIP Code Phone Number   [facility]   [address] [place] [address]        Back to top of Lab Results       D Dimer, Quant. (05/04/2021 11:15 AM CDT)  D Dimer, Quant. (05/04/2021 11:15 AM CDT)   Component Value Ref Range Performed At Pathologist [facility]   D Dimer, Quant 1.21 (H) <=0.50 ug/mL [facility]       D Dimer, Quant. (05/04/2021 11:15 AM CDT)   Specimen   Blood     D Dimer, Quant. (05/04/2021 11:15 AM CDT)   Narrative Performed At   A D-dimer level <=0.50 ug/mL FEU in a patient with a low  clinical pretest probability for a 1st episode of DVT can rule out lower extremity DVT (rate of DVT in next 3 months < 2%).           For patients greater than 50 years of age, the application of age-adjusted cut-off values for D-Dimer may increase the specificity without significant effect on sensitivity. The results in this laboratory are reported as ug/mL FEU. The calculation for age adjusted cut off in ug/mL FEU = age in years x 0.01 ug/mL FEU. For example, the cut off for a 76 year old male is 76 x 0.01 ug/mL FEU = <=0.76 ug/mL FEU.    Increased D-dimer is seen in thromboembolism, DIC, liver disease, renal disease, cardiac infarct and failure, cancer, pregnancy, stroke, infection, recent surgery, hemorrhage and age > 70 years.         D-dimer levels decrease with anticoagulation therapy and increasing clot age.   [facility]       D Dimer, Quant. (05/04/2021 11:15 AM CDT)   Performing Organization Address City/[facility] Code Phone Number   [facility]   [address] [place] [address] [phone]     Back to top of Lab Results       Complete Blood Count -W/Diff (05/04/2021 11:15 AM CDT)  Complete Blood Count -W/Diff (05/04/2021 11:15 AM CDT)   Specimen   Blood     Complete Blood Count -W/Diff (05/04/2021 11:15 AM CDT)   Narrative Performed At   The following orders were created for panel order Complete Blood Count -W/Diff.    Procedure                               Abnormality         Status                       ---------                               -----------         ------                       Complete Blood Count-W/...[[id number]]  Abnormal            Final result                     Please view results for these tests on the individual orders.   PN EXTERNAL LAB-SEE SCANNED DOCUMENT       Complete Blood Count -W/Diff (05/04/2021 11:15 AM CDT)   Performing Organization Address City/[facility]/ZIP Code Phone Number   PN EXTERNAL LAB-SEE SCANNED DOCUMENT            PN  "EXTERNAL LAB-SEE SCANNED DOCUMENT   \"Do Not Mail\"          Back to top of Lab Results       Basic Metabolic Panel (05/04/2021 11:15 AM CDT)  Basic Metabolic Panel (05/04/2021 11:15 AM CDT)   Component Value Ref Range Performed At Pathologist [facility]   Sodium 140 136 - 145 mmol/L [facility]     Potassium 3.2 (L) 3.5 - 5.1 mmol/L [facility]     Chloride 103 98 - 109 mmol/L [facility]     CO2 25 20 - 29 mmol/L [facility]     Anion Gap 12 7 - 16 mmol/L [facility]     Calcium 8.6 8.4 - 10.4 mg/dL [facility]     BUN 20 7 - 26 mg/dL [facility]     Creatinine 0.80 0.55 - 1.02 mg/dL [facility]     GFR, Estimated >60 >60 mL/min/1.73m2 [facility]     Glucose 148 (H)Comment: The given reference range is for the fasting state. Non-fasting reference range for glucose is 70 - 180 mg/dL. 70 - 100 mg/dL [facility]     Hours Fasting 1   [facility]       Basic Metabolic Panel (05/04/2021 11:15 AM CDT)   Specimen   Blood     Basic Metabolic Panel (05/04/2021 11:15 AM CDT)   Performing Organization Address City/State/ZIP Code Phone Number   [facility]   [address] [place] [address] [phone]     [facility]   [address] [place] [address]   [phone]     Back to top of Lab Results    Imaging Results  - documented in this encounter    XR Chest 2 Views (05/04/2021 11:47 AM CDT)  XR Chest 2 Views (05/04/2021 11:47 AM CDT)   Specimen         XR Chest 2 Views (05/04/2021 11:47 AM CDT)   Impressions Performed At   COMPARISON:  01/17/2017        FINDINGS:  Heart is mildly enlarged with sternal wires present. There are interstitial infiltrates at the lung bases with redistribution pulmonary vasculature consistent with mild pulmonary vascular congestion.   PN RADIOLOGY       XR Chest 2 Views (05/04/2021 11:47 AM CDT)   Procedure Note   [place], Rad " Results In - 05/04/2021 1:08 PM CDT    Formatting of this note might be different from the original.  IMPRESSION  COMPARISON: 01/17/2017    FINDINGS: Heart is mildly enlarged with sternal wires present. There are interstitial infiltrates at the lung bases with redistribution pulmonary vasculature consistent with mild pulmonary vascular congestion.             Jorge Alberto Myers MD  Hospitalist  [facility]

## 2021-05-05 NOTE — PROGRESS NOTES
Update/Clarification:     TSH and T4 numbers from my history and physical were from February 21, 2021, NOT the day prior to admission as I had previously thought.  I have ordered a recheck.  I have reduced methimazole back to 5 mg daily, defer to andre PEREZ for further adjustments.  She should still have follow up with her endocrinologist.    Pablito Myers MD

## 2021-05-05 NOTE — UTILIZATION REVIEW
Admission Status; Secondary Review Determination         Under the authority of the Utilization Management Committee, the utilization review process indicated a secondary review on the above patient.  The review outcome is based on review of the medical records, discussions with staff, and applying clinical experience noted on the date of the review.        (x)      Inpatient Status Appropriate - This patient's medical care is consistent with medical management for inpatient care and reasonable inpatient medical practice.      RATIONALE FOR DETERMINATION   The patient is a 75-year-old female admitted on 5/04/2021.  She came in because of progressively worsening shortness of breath of her 3 days prior to admission.  She is noted to have a hemoglobin of 7.2 and was transfused 1 unit of packed red cells.  She is also noted to have a suppressed TSH with an elevated T4 and elevated D-dimer.  She has a history of thyroid cancer.  Her blood pressure was in the 190s to 200 range systolic on admission.  Is also noted to have signs of pulmonary edema on both previous chest x-ray mentioned in the notes and on CT of her chest and lungs.  A proBNP is not noted but the notes do suggest that her shortness of breath is likely due to a combination of both anemia and congestive heart failure which is currently being treated.  There is also a pulmonary nodule noted in the anterior medial right lower lobe which will require follow-up as an outpatient.  Echocardiogram is pending.  Due to instability and need for treatment of congestive heart failure, inpatient status is appropriate.  There does not appear to be any indication for discharge today. Conditionally, if the patient ends up being discharged today, status should be changed to Obs.        The severity of illness, intensity of service provided, expected LOS and risk for adverse outcome make the care complex, high risk and appropriate for hospital admission.        The  information on this document is developed by the utilization review team in order for the business office to ensure compliance.  This only denotes the appropriateness of proper admission status and does not reflect the quality of care rendered.         The definitions of Inpatient Status and Observation Status used in making the determination above are those provided in the CMS Coverage Manual, Chapter 1 and Chapter 6, section 70.4.      Sincerely,     James Solis MD  Physician Advisor  Utilization Review/ Case Management  [facility].

## 2021-05-05 NOTE — PLAN OF CARE
Pt arrived from ER at 2250, Pt walked to room A1. Bp elevated 183/67. SPO2 97% RA. LS: Clear. Pt on Tele. A&OX4. BLE edema more on Ankles. HBG 7.2 1 unit of blood ordered for transfusion.

## 2021-05-05 NOTE — ED NOTES
.  [facility]  ED Nurse Handoff Report    Heather Martin is a 75 year old female   ED Chief complaint: Abnormal Labs  . ED Diagnosis:   Final diagnoses:   Dyspnea on exertion   Anemia   Elevated brain natriuretic peptide (BNP) level   Pleural effusion     Allergies: No Known Allergies    Code Status: Full Code  Activity level - Baseline/Home:  Independent. Activity Level - Current:   Stand by Assist. Lift room needed: No. Bariatric: No   Needed: No   Isolation: No. Infection: Not Applicable.     Vital Signs:   Vitals:    05/04/21 2100 05/04/21 2115 05/04/21 2130 05/04/21 2200   BP:  (!) 201/80 (!) 190/87 (!) 191/80   Pulse:   71 71   Resp:       Temp:       TempSrc:       SpO2: 95%          Cardiac Rhythm:  ,      Pain level:    Patient confused: No. Patient Falls Risk: Yes.   Elimination Status: Has voided x 2, ambulating with steady gait  Patient Report - Initial Complaint: 75 year old female with history of MI, Atrial fibrillation, hypertension, CABG and diabetes who presents with abnormal labs. The patient reports that she had a routine checkup for her A1C with her primary care provider today. Her hemoglobin level was low at this appointment (7.2). She has experienced shortness of breath with exertion for the last three days, so the provider recommended that she visit the ED for further care. The patient denies chest pain, fever, chills, cough, rhinorrhea, abdominal pain, nausea, vomiting, diarrhea, or black/bloody stools..   Focused Assessment:   Respiratory Respiratory - Respiratory WDL: .WDL except; all (resp even and equal; satting upper 90% on RA) Rhythm/Pattern, Respiratory: dyspnea on exertion  Breath Sounds: All Fields   Head To Toe Assessment - All Lung Fields Breath Sounds: Posterior:; clear         Tests Performed: labs, EKG, CT chest. Abnormal Results: .  Labs Ordered and Resulted from Time of ED Arrival Up to the Time of Departure from the ED   CBC WITH PLATELETS  DIFFERENTIAL - Abnormal; Notable for the following components:       Result Value    RBC Count 3.17 (*)     Hemoglobin 7.2 (*)     Hematocrit 25.0 (*)     MCH 22.7 (*)     MCHC 28.8 (*)     RDW 20.5 (*)     All other components within normal limits   IRON AND IRON BINDING CAPACITY - Abnormal; Notable for the following components:    Iron 19 (*)     Iron Binding Cap 436 (*)     Iron Saturation Index 4 (*)     All other components within normal limits   TROPONIN I   CREATININE POCT   OCCULT BLOOD STOOL   SARS-COV-2 (COVID-19) VIRUS RT-PCR   BLOOD MORPHOLOGY PATHOLOGIST REVIEW   FERRITIN   RETICULOCYTE COUNT   PERIPHERAL IV CATHETER   CARDIAC CONTINUOUS MONITORING   ABO/RH TYPE AND SCREEN     .  CT Chest Pulmonary Embolism w Contrast   Final Result   IMPRESSION:   1.  Allowing for motion artifact, there is no definitive evidence for pulmonary embolism.      2.  Normal caliber thoracic aorta without dissection.      3.  Well-circumscribed solid noncalcified pulmonary nodule in the anteromedial right lower lobe with mean diameter of 1 cm. Management recommendations as detailed below.      4.  Interlobular septal thickening with minimal bilateral pleural fluid collections and mosaic attenuation. Findings can be seen with volume overload.      5.  Mild bronchial wall thickening diffusely which can be seen with bronchial inflammation/bronchitis.      Fleischner Society Recommendations for Pulmonary Nodules      Nodule size greater than 8 mm:      Consider CT, PET/CT, or tissue sampling at three months         [Recommend Follow Up: Lung nodule]      This report will be copied to the [facility] to ensure a provider acknowledges the finding.             Treatments provided: see ED meds below  Family Comments: NA  OBS brochure/video discussed/provided to patient:  N/A  ED Medications:   Medications   iopamidol (ISOVUE-370) solution 500 mL (66 mLs Intravenous Given 5/4/21 6831)   CT Scan Flush (87 mLs Intravenous  Given 5/4/21 1859)   furosemide (LASIX) injection 40 mg (40 mg Intravenous Given 5/4/21 2118)     Drips infusing:  No  For the majority of the shift, the patient's behavior Green. Interventions performed were NA.    Sepsis treatment initiated: No     Patient tested for COVID 19 prior to admission: YES    ED Nurse Name/Phone Number: Mahnaz Gomes RN,   8:02 PM  RECEIVING UNIT ED HANDOFF REVIEW    Above ED Nurse Handoff Report was reviewed: Yes  Reviewed by: Riaz Patel RN on May 4, 2021 at 10:30 PM

## 2021-05-05 NOTE — ED NOTES
Ambulated to BR upon arrival to room, roughly 100-120'.  No distress noted or stated.  Steady gait to BR

## 2021-05-05 NOTE — ED PROVIDER NOTES
Emergency Department Attending Supervision Note  5/4/2021  8:27 PM    I evaluated this patient in conjunction with Tremaine Crane PA-C     Briefly, the patient presented with abnormal labs. She was seen today by her PCP for a routine checkup, and her HGB A1c was found to be low at 7.2. She has also reported exertional shortness of breath for 3 days so it was advised that she go to the ER for further workup. She denies any other symptoms.     On my exam,   BP (!) 191/80   Pulse 71   Temp 98.6  F (37  C) (Temporal)   Resp 21   SpO2 95%   General: Alert, appears elderly, otherwise well-developed and well-nourished. Cooperative.     In mild distress  HEENT:  Head:  Atraumatic  Ears:  External ears are normal  Mouth/Throat:  Oropharynx is without erythema or exudate and mucous membranes are moist.   Eyes:   Conjunctivae normal and EOM are normal. No scleral icterus.  CV:  Normal rate, regular rhythm, normal heart sounds and radial pulses are 2+ and symmetric.    Resp:  Breath sounds are clear bilaterally with no expiratory wheezing.     Non-labored, no retractions or accessory muscle use  GI:  Abdomen is soft, no distension, no tenderness. No rebound or guarding.  No CVA tenderness bilaterally  MS:  Normal range of motion.Trace BLE edema.    Normal strength in all 4 extremities.     Back atraumatic.    No midline cervical, thoracic, or lumbar tenderness  Skin:  Warm and dry.  No rash or lesions noted.  Neuro:  Alert. Normal strength.  GCS: 15  Psych:  Normal mood and affect.    Results:  Labs:   Labs Ordered and Resulted from Time of ED Arrival Up to the Time of Departure from the ED   CBC WITH PLATELETS DIFFERENTIAL - Abnormal; Notable for the following components:       Result Value    RBC Count 3.17 (*)     Hemoglobin 7.2 (*)     Hematocrit 25.0 (*)     MCH 22.7 (*)     MCHC 28.8 (*)     RDW 20.5 (*)     All other components within normal limits   TROPONIN I   CREATININE POCT   OCCULT BLOOD STOOL   SARS-COV-2  (COVID-19) VIRUS RT-PCR   PERIPHERAL IV CATHETER   CARDIAC CONTINUOUS MONITORING   ABO/RH TYPE AND SCREEN       Imaging:   CT Chest Pulmonary Embolism w Contrast   Final Result   IMPRESSION:   1.  Allowing for motion artifact, there is no definitive evidence for pulmonary embolism.      2.  Normal caliber thoracic aorta without dissection.      3.  Well-circumscribed solid noncalcified pulmonary nodule in the anteromedial right lower lobe with mean diameter of 1 cm. Management recommendations as detailed below.      4.  Interlobular septal thickening with minimal bilateral pleural fluid collections and mosaic attenuation. Findings can be seen with volume overload.      5.  Mild bronchial wall thickening diffusely which can be seen with bronchial inflammation/bronchitis.      Fleischner Society Recommendations for Pulmonary Nodules      Nodule size greater than 8 mm:      Consider CT, PET/CT, or tissue sampling at three months         [Recommend Follow Up: Lung nodule]      This report will be copied to the [facility] to ensure a provider acknowledges the finding.              Interventions:   Medications   iopamidol (ISOVUE-370) solution 500 mL (66 mLs Intravenous Given 5/4/21 1851)   CT Scan Flush (87 mLs Intravenous Given 5/4/21 1859)        ED course:    2106 I evaluated the patient and performed an exam as above.    MDM:  Patient is a 75-year-old female who presents with several days of worsening exertional shortness of breath.  I suspect this is likely multifactorial.  Patient's hemoglobin is significantly decreased in comparison with prior hemoglobin values.  Reassuringly, stool occult negative, and low concern for GI hemorrhage.  She also had no hematemesis.  Suspect likely acute on chronic anemia as the cause of her anemia this evening.  As her hemoglobin is greater than 7, no indication for emergent blood transfusion at this time but she may require a blood transfusion if she remains symptomatic  with no other clear etiology.  I do suspect there may be a possible heart failure component too as the cause of her exertional dyspnea and she does have some trace lower extremity edema and small bilateral pleural fluid collections on CT imaging tonight.  We will plan to treat with diuretics and admit to the hospital for further evaluation of suspected heart failure exacerbation versus acute on chronic anemia as the cause of patient's exertional dyspnea.  Lower concern for ACS, but will continue to rule this out upon admission.  Agree with the medical decision making and plan as described in Tremaine Crane PA-C's ED provider note.    Diagnosis    ICD-10-CM    1. Dyspnea on exertion  R06.00 Stool: occult blood   2. Anemia  D64.9    3. Elevated brain natriuretic peptide (BNP) level  R79.89    4. Pleural effusion  J90          Tremaine Crane PA-C White, Scott, MD  05/04/21 3582

## 2021-05-05 NOTE — PLAN OF CARE
A/O. No shortness of breath at this time. ECHO done this morning. K+ and magnesium replacement initiated this shift. , 153. Makes needs known. Up with A x 1 and gait belt. Will continue with POC.    K+ recheck 3.3. Replaced again, Recheck ordered for 1845.

## 2021-05-05 NOTE — PHARMACY-ADMISSION MEDICATION HISTORY
Admission medication history interview status for this patient is complete. See [facility] admission navigator for allergy information, prior to admission medications and immunization status.     Medication history interview done, indicate source(s): Patient  Medication history resources (including written lists, pill bottles, clinic record): [facility] fill history, patient's AVS med list from [facility] appointment 5/4/21  Pharmacy: [facility] for discharge meds, [facility] for routine Rxs     Changes made to PTA medication list:  Added: azelastine, sertraline, methimazole, Zyrtec D, multivitamin  Deleted: ferrous sulfate 325 mg daily (no longer taking), allegra 60 mg BID PRN (takes Zyrtec D), furosemide 10 mg MWF (no longer taking), iron-vitC-vitB12-folic acid (patient did not recall ingredients, but taking general multivitamin)   Changed: acetaminophen 325-650 mg q4h PRN --> 1000 mg nightly (patient confirmed not taking PRN), olopatadine from daily--> BID    Actions taken by pharmacist (provider contacted, etc):None     Additional medication history information: Patient is a reliable historian, able to account for discrepancies on medication list from appointment 5/4/21 (for example, ranitidine still present on AVS list). States she stopped taking famotidine recently (last filled 3/2021 for #30ds) due to side effects. Confirmed she has not started newly prescribed lisinopril-hydrochlorothiazide (Rx written at appointment 5/4/21); mentioned that outpatient provider's goal was to lower other antihypertensives with initiation of this therapy.     Medication reconciliation/reorder completed by provider prior to medication history?  N     Prior to Admission medications    Medication Sig Last Dose Taking? Auth Provider   acetaminophen (TYLENOL) 500 MG tablet Take 1,000 mg by mouth At Bedtime 5/3/2021 at PM Yes Unknown, Entered By History   albuterol (PROAIR HFA/PROVENTIL HFA/VENTOLIN HFA) 108 (90 Base) MCG/ACT inhaler  Inhale 1-2 puffs into the lungs every 4 hours as needed for shortness of breath / dyspnea or wheezing Past Week at Unknown time Yes Unknown, Entered By History   amLODIPine (NORVASC) 2.5 MG tablet Take 2.5 mg by mouth daily 5/4/2021 at AM Yes Unknown, Entered By History   aspirin 81 MG EC tablet Take 81 mg by mouth At Bedtime 5/3/2021 at PM Yes Unknown, Entered By History   atorvastatin (LIPITOR) 80 MG tablet Take 80 mg by mouth At Bedtime 5/3/2021 at PM Yes Unknown, Entered By History   azelastine (ASTELIN) 0.1 % nasal spray Spray 1 spray into both nostrils 2 times daily 5/3/2021 at Unknown time Yes Unknown, Entered By History   cetirizine-pseudoePHEDrine ER (ZYRTEC-D) 5-120 MG 12 hr tablet Take 1 tablet by mouth daily as needed for allergies 5/3/2021 at Unknown time Yes Unknown, Entered By History   lisinopril-hydrochlorothiazide (PRINZIDE/ZESTORETIC) 10-12.5 MG tablet Take 1 tablet by mouth daily  at Not yet started Yes Unknown, Entered By History   metFORMIN (GLUCOPHAGE) 500 MG tablet Take 500 mg by mouth daily (with breakfast) 5/3/2021 at AM Yes Unknown, Entered By History   methimazole (TAPAZOLE) 5 MG tablet Take 5 mg by mouth daily 5/4/2021 at AM Yes Unknown, Entered By History   metoprolol succinate ER (TOPROL-XL) 100 MG 24 hr tablet Take 100 mg by mouth daily 5/3/2021 at Unknown time Yes Unknown, Entered By History   multivitamin w/minerals (THERA-VIT-M) tablet Take 1 tablet by mouth daily 5/3/2021 at Unknown time Yes Unknown, Entered By History   olopatadine (PATANOL) 0.1 % ophthalmic solution Place 1 drop into both eyes 2 times daily  5/3/2021 at Unknown time Yes Unknown, Entered By History   sertraline (ZOLOFT) 25 MG tablet Take 25 mg by mouth daily 5/3/2021 at PM Yes Unknown, Entered By History   vitamin D3 (CHOLECALCIFEROL) 2000 units tablet Take 1 tablet by mouth every morning 5/4/2021 at AM Yes Unknown, Entered By History   amoxicillin (AMOXIL) 500 MG capsule Take 2,000 mg by mouth as needed (1 hour  prior to dental work) Unknown at Unknown time  Unknown, Entered By History       Sadia Casanova  PGY-1 Pharmacy Practice Resident

## 2021-05-05 NOTE — ED NOTES
"Ambulated to BR. Upon returning to room patient stopped and stated, \"I need my inhaler\". Brought back to room in wheelchair with O2 sats 91% on RA.    "

## 2021-05-05 NOTE — PROGRESS NOTES
[facility]    Hospitalist Progress Note    Date of Service (when I saw the patient): 05/05/2021  Provider:  Pantera Melvin MD   Text Page  7am - 6PM       Assessment & Plan   Heather Martin is a 75 year old female with PMH including CAD with CABG in 2017, hypertension, type 2 diabetes, functioning thyroid nodules with history of thyroid lobectomy on methimazole, atrial fibrillation who presents with dyspnea on exertion found to have new anemia.  She was seen in her family medicine clinic and reported shortness of breath for about the past 3 days which prompted further evaluation.  Labs eventually returned showing a hemoglobin of 7.2 with no recent baseline available but prior values over a year ago showing hemoglobin in the 10-11 range.  She was also found to have suppressed TSH with elevated T4 and elevated D-dimer.  EKG reportedly showed sinus rhythm with borderline bradycardia with a rate of 59.  Chest x-ray was suggestive of interstitial infiltrates with redistribution of pulmonary vasculature consistent with mild pulmonary vascular congestion.     In the emergency emergency department she had systolic hypertension 190-200 range. Troponin was detectable at 0.019.  COVID-19 PCR test negative. CT chest PE protocol negative for PE.   She received 1 PRBC unit and Lasix 20 mg IV.  Her methimazole dose was increased due to concern that laboratory findings of hyperthyroid activity might be expression of noncompliance with medications causing hypertension and cardiovascular derangement.  Echocardiogram has proven normal LVEF and ventricular function.  Grade 2 diastolic dysfunction.  Normal RV function.     Assessment and Plan:   1. Dyspnea on exertion:  Combination of symptomatic anemia and diastolic dysfunction with congestive heart failure and aggravated by hyperthyroidism and hypertension.       2.   Acute on chronic symptomatic anemia.  Known iron deficiency anemia, apparently not taking  any iron supplementation for a long time.  No evidence of blood losses/bleeding.    Iron supplementation ordered.  No aspirin or NSAID.  She should follow-up in the outpatient clinic with her primary care physician.     3.   History of nodules of the thyroid, prior right lobectomy in July 2020 goiter.  apparent hyper-thyroidism currently: Was restarted on methimazole late February 2021 due to hyperactive range thyroid testing at that time.  Note the TSH in her clinic was quite suppressed on outside labs today (<0.02) and T4 was elevated at 2.10.  It is difficult to say to what extent this is causing her symptoms.    --Continue methimazole but increase from 5 mg to 10 mg daily  --she should have short term follow up with her endocrinologist.     4.   History of type 2 diabetes on Metformin: Hold Metformin in favor of sliding scale insulin for now.     5.   CAD with prior CABG in 2017, accelerated hypertension: Blood pressure in the 180s to 200s systolic in the ER.  See above.  --Resume home Toprol- mg daily, lisinopril and HCTZ.    --she also is chronically on atorvastatin 80 mg daily.    --ASA 81 mg.  -- IV hydralazine and labetalol as needed     6.  History of atrial fibrillation: Not on anticoagulation  Continue oral metoprolol succinate 100 mg daily and hold aspirin as above.  Sinus rhythm.     7.  Incidental finding of a well-circumscribed solid noncalcified pulmonary nodule in the anteromedial right lower lobe with mean diameter of 1 cm.   --Follow-up in clinic      8.  History of lower extremity edema:  Noncompliance with Lasix at home.    9.  Hypokalemia, replacement protocol.      Plan.  -Inpatient  -Cardiac telemetry, pulse oximetry  -O2 supplement per nasal cannula to keep saturation 90% or higher as needed.  -Cardiac diet, 2 g sodium.  -Amlodipine 12 mg 1 tablet daily  -Metoprolol succinate 100 mg 1 tablet daily  -Hydrochlorothiazide 12.5 mg 1 tablet daily.  -Lisinopril 10 mg 1 tablet  daily.  -Atorvastatin 80 mg 1 tablet daily.  -Methimazole 10 mg 1 tablet daily.  -For famotidine/vitamin C 1 tablet daily.  -Aspirin 81 mg 1 tablet daily  -Insulin sliding scale.  -Multivitamins, olopatadine, sertraline and vitamin D as PTA.    DVT Prophylaxis: Pneumatic Compression Devices  Code Status: Full Code    Disposition: Expected discharge in 2 - 3 days once stable.    Interval History   She is feeling better today, denies shortness of breath or chest pain.  She sees Astrid, has breakfast.  She has no concerns at the moment of my visit.    -Data reviewed today: I reviewed all new labs and imaging results over the last 24 hours. I personally reviewed the EKG tracing showing Sinus rhythm, heart rate 57 in the monitor..    Physical Exam   Temp: 98.1  F (36.7  C) Temp src: Oral BP: (Abnormal) 171/68 Pulse: 73   Resp: 18 SpO2: 93 % O2 Device: None (Room air)    Vitals:    05/05/21 0758   Weight: 56.2 kg (124 lb)     Vital Signs with Ranges  Temp:  [97.8  F (36.6  C)-98.6  F (37  C)] 98.1  F (36.7  C)  Pulse:  [64-79] 73  Resp:  [15-26] 18  BP: (165-204)/(51-87) 171/68  SpO2:  [93 %-99 %] 93 %  I/O last 3 completed shifts:  In: -   Out: 1275 [Urine:1275]    GEN:  Alert, oriented x 3, appears comfortable, NAD.  HEENT:  Normocephalic/atraumatic, no scleral icterus, no nasal discharge, mouth moist.  CV:  Regular rate and rhythm, no murmur or JVD.  S1 + S2 noted, no S3 or S4.  LUNGS:  Clear to auscultation bilaterally without rales/rhonchi/wheezing/retractions.  Symmetric chest rise on inhalation noted.  ABD:  Active bowel sounds, soft, non-tender/non-distended.  No rebound/guarding/rigidity.  EXT:  BLE edema, no cyanosis.  No joint synovitis noted.  SKIN:  Dry to touch, no exanthems noted in the visualized areas.       Medications       amLODIPine  2.5 mg Oral Daily     aspirin  81 mg Oral At Bedtime     atorvastatin  80 mg Oral At Bedtime     ferrous fumarate 65 mg ([place]. FE)-Vitamin C 125 mg  1 tablet Oral Daily      lisinopril  10 mg Oral Daily    And     hydrochlorothiazide  12.5 mg Oral Daily     insulin aspart  1-7 Units Subcutaneous TID AC     insulin aspart  1-5 Units Subcutaneous At Bedtime     magnesium oxide  400 mg Oral BID     [START ON 5/6/2021] methimazole  10 mg Oral Daily     metoprolol succinate ER  100 mg Oral Daily     multivitamin w/minerals  1 tablet Oral Daily     olopatadine  1 drop Both Eyes BID     sertraline  25 mg Oral Daily     sodium chloride (PF)  3 mL Intracatheter Q8H     vitamin D3  50 mcg Oral QAM       Data   Recent Labs   Lab 05/04/21  2347 05/04/21  1731   WBC  --  6.5   HGB  --  7.2*   MCV  --  79   PLT  --  312   POTASSIUM 3.0*  --    TROPI  --  0.019       Recent Results (from the past 24 hour(s))   CT Chest Pulmonary Embolism w Contrast   Result Value    Radiologist flags Lung nodule    Narrative    EXAM: CT CHEST PULMONARY EMBOLISM W CONTRAST  LOCATION: [facility]  DATE/TIME: 5/4/2021 6:49 PM    INDICATION: PE suspected, low/intermediate probability. Positive D-dimer. Dyspnea.  COMPARISON: None.  TECHNIQUE: CT chest pulmonary angiogram during arterial phase injection of IV contrast. Multiplanar reformats and MIP reconstructions were performed. Dose reduction techniques were used.   CONTRAST: 66 mL Isovue-370.    FINDINGS:  ANGIOGRAM CHEST: Allowing for motion artifact there is no definitive evidence for pulmonary embolism. Normal caliber thoracic aorta without dissection. Moderate aortic calcification.    LUNGS AND PLEURA: Diffuse interlobular septal thickening with mosaic attenuation of the pulmonary parenchyma which can be seen with volume overload. Well-circumscribed solid noncalcified pulmonary nodule anteromedial right lower lobe with mean diameter   of 1 cm (series 8, image 96). Mild bronchial wall thickening.    MEDIASTINUM/AXILLAE: Minimal cardiac enlargement. No lymphadenopathy. Thyroid goiter.    CORONARY ARTERY CALCIFICATION: Previous intervention (stents or  CABG).    UPPER ABDOMEN: Unremarkable.    MUSCULOSKELETAL: Minimal degenerative changes thoracic spine.      Impression    IMPRESSION:  1.  Allowing for motion artifact, there is no definitive evidence for pulmonary embolism.    2.  Normal caliber thoracic aorta without dissection.    3.  Well-circumscribed solid noncalcified pulmonary nodule in the anteromedial right lower lobe with mean diameter of 1 cm. Management recommendations as detailed below.    4.  Interlobular septal thickening with minimal bilateral pleural fluid collections and mosaic attenuation. Findings can be seen with volume overload.    5.  Mild bronchial wall thickening diffusely which can be seen with bronchial inflammation/bronchitis.    Fleischner Society Recommendations for Pulmonary Nodules    Nodule size greater than 8 mm:    Consider CT, PET/CT, or tissue sampling at three months      [Recommend Follow Up: Lung nodule]    This report will be copied to the [facility] to ensure a provider acknowledges the finding.          Disclaimer: This note consists of symbols derived from keyboarding, dictation and/or voice recognition software. As a result, there may be errors in the script that have gone undetected. Please consider this when interpreting information found in this chart.

## 2021-05-05 NOTE — PLAN OF CARE
A&Ox4, A1 bedside commode, PIV (R) SL, RA, Tele, Denies Pain & SOB. Hgb 7.2 - 1 unit blood given. . K+ & Mg Protocol. SBP elevated BP (!) 171/68   Pulse 73   Temp 98.1  F (36.7  C) (Oral)   Resp 18   SpO2 93%

## 2021-05-06 VITALS
TEMPERATURE: 97.7 F | OXYGEN SATURATION: 100 % | WEIGHT: 123 LBS | BODY MASS INDEX: 21.79 KG/M2 | HEIGHT: 63 IN | HEART RATE: 66 BPM | DIASTOLIC BLOOD PRESSURE: 54 MMHG | SYSTOLIC BLOOD PRESSURE: 167 MMHG | RESPIRATION RATE: 18 BRPM

## 2021-05-06 LAB
COPATH REPORT: NORMAL
GLUCOSE BLDC GLUCOMTR-MCNC: 111 MG/DL (ref 70–99)
GLUCOSE BLDC GLUCOMTR-MCNC: 118 MG/DL (ref 70–99)
GLUCOSE BLDC GLUCOMTR-MCNC: 119 MG/DL (ref 70–99)
HGB BLD-MCNC: 9.4 G/DL (ref 11.7–15.7)
MAGNESIUM SERPL-MCNC: 1.7 MG/DL (ref 1.6–2.3)
POTASSIUM SERPL-SCNC: 3.6 MMOL/L (ref 3.4–5.3)

## 2021-05-06 PROCEDURE — 99239 HOSP IP/OBS DSCHRG MGMT >30: CPT | Performed by: INTERNAL MEDICINE

## 2021-05-06 PROCEDURE — 83735 ASSAY OF MAGNESIUM: CPT | Performed by: INTERNAL MEDICINE

## 2021-05-06 PROCEDURE — 250N000013 HC RX MED GY IP 250 OP 250 PS 637: Performed by: INTERNAL MEDICINE

## 2021-05-06 PROCEDURE — 250N000013 HC RX MED GY IP 250 OP 250 PS 637: Performed by: HOSPITALIST

## 2021-05-06 PROCEDURE — 84132 ASSAY OF SERUM POTASSIUM: CPT | Performed by: INTERNAL MEDICINE

## 2021-05-06 PROCEDURE — 36415 COLL VENOUS BLD VENIPUNCTURE: CPT | Performed by: HOSPITALIST

## 2021-05-06 PROCEDURE — 85018 HEMOGLOBIN: CPT | Performed by: HOSPITALIST

## 2021-05-06 PROCEDURE — 36415 COLL VENOUS BLD VENIPUNCTURE: CPT | Performed by: INTERNAL MEDICINE

## 2021-05-06 PROCEDURE — 999N001017 HC STATISTIC GLUCOSE BY METER IP

## 2021-05-06 RX ORDER — MAGNESIUM OXIDE 400 MG/1
400 TABLET ORAL 2 TIMES DAILY
Qty: 10 TABLET | Refills: 0 | Status: SHIPPED | OUTPATIENT
Start: 2021-05-06

## 2021-05-06 RX ORDER — POTASSIUM CHLORIDE 1500 MG/1
20 TABLET, EXTENDED RELEASE ORAL ONCE
Status: DISCONTINUED | OUTPATIENT
Start: 2021-05-06 | End: 2021-05-06 | Stop reason: CLARIF

## 2021-05-06 RX ORDER — FUROSEMIDE 20 MG
20 TABLET ORAL DAILY
Qty: 30 TABLET | Refills: 0 | Status: SHIPPED | OUTPATIENT
Start: 2021-05-06 | End: 2021-05-06

## 2021-05-06 RX ORDER — MAGNESIUM OXIDE 400 MG/1
400 TABLET ORAL 2 TIMES DAILY
Qty: 10 TABLET | Refills: 0 | Status: SHIPPED | OUTPATIENT
Start: 2021-05-06 | End: 2021-05-06

## 2021-05-06 RX ORDER — FUROSEMIDE 20 MG
20 TABLET ORAL DAILY
Qty: 30 TABLET | Refills: 0 | Status: SHIPPED | OUTPATIENT
Start: 2021-05-06

## 2021-05-06 RX ADMIN — METOPROLOL SUCCINATE 100 MG: 100 TABLET, EXTENDED RELEASE ORAL at 08:03

## 2021-05-06 RX ADMIN — OLOPATADINE HYDROCHLORIDE 1 DROP: 1 SOLUTION/ DROPS OPHTHALMIC at 08:04

## 2021-05-06 RX ADMIN — SERTRALINE HYDROCHLORIDE 25 MG: 25 TABLET ORAL at 08:03

## 2021-05-06 RX ADMIN — LISINOPRIL 10 MG: 10 TABLET ORAL at 08:03

## 2021-05-06 RX ADMIN — METHIMAZOLE 5 MG: 5 TABLET ORAL at 08:03

## 2021-05-06 RX ADMIN — MULTIPLE VITAMINS W/ MINERALS TAB 1 TABLET: TAB at 08:03

## 2021-05-06 RX ADMIN — MAGNESIUM OXIDE TAB 400 MG (241.3 MG ELEMENTAL MG) 400 MG: 400 (241.3 MG) TAB at 08:03

## 2021-05-06 RX ADMIN — Medication 50 MCG: at 08:03

## 2021-05-06 RX ADMIN — HYDROCHLOROTHIAZIDE 12.5 MG: 12.5 CAPSULE ORAL at 08:03

## 2021-05-06 RX ADMIN — AMLODIPINE BESYLATE 2.5 MG: 2.5 TABLET ORAL at 08:03

## 2021-05-06 RX ADMIN — Medication 1 TABLET: at 08:03

## 2021-05-06 ASSESSMENT — ACTIVITIES OF DAILY LIVING (ADL)
ADLS_ACUITY_SCORE: 16

## 2021-05-06 ASSESSMENT — MIFFLIN-ST. JEOR: SCORE: 1017.05

## 2021-05-06 NOTE — DISCHARGE SUMMARY
Pt. Discharged back home. Verbalized understanding of AVS and questions answered. New medication education given. CHF education and handouts given, pt verbalized understanding. Wheelchair ride given to front of hospital.

## 2021-05-06 NOTE — PROGRESS NOTES
"BP (!) 158/62 (BP Location: Right arm)   Pulse 73   Temp 97.6  F (36.4  C) (Oral)   Resp 16   Ht 1.6 m (5' 3\")   Wt 55.8 kg (123 lb)   SpO2 96%   BMI 21.79 kg/m    Patient alert and oriented. Up with Assist of one gait belt.  Denies pain or shortness of breath this shift. Calls appropriately. On room air. Saline locked. Will continue to monitor and provide supportive cares.  "

## 2021-05-23 NOTE — DISCHARGE SUMMARY
[facility]    Discharge Summary  Hospitalist    Date of Admission:  5/4/2021  Date of Discharge:  5/6/2021  2:24 PM  Discharging Provider: Miguel Nelson MD  Date of Service (when I saw the patient): 5/6/2021      Discharge Diagnoses   Dyspnea on exertion.  Due to a combination of symptomatic anemia and diastolic dysfunction.  Heart failure with preserved ejection fraction.  CHF exacerbation.  Acute on chronic anemia.  Thought to be iron deficiency anemia.  Hyperthyroidism.  Diabetes.  Coronary artery disease, CABG in 2017.  Hypertension.  History of atrial fibrillation.  Pulmonary nodule.  Outpatient follow-up.      History of Present Illness   Heather Martin is a 75 year old female with PMH including CAD with CABG in 2017, hypertension, type 2 diabetes, functioning thyroid nodules with history of thyroid lobectomy on methimazole, atrial fibrillation who presents with dyspnea on exertion found to have new anemia.  She was seen in her family medicine clinic and reported shortness of breath for about the past 3 days which prompted further evaluation.  Labs eventually returned showing a hemoglobin of 7.2 with no recent baseline available but prior values over a year ago showing hemoglobin in the 10-11 range.  She was also found to have suppressed TSH with elevated T4 and elevated D-dimer.  EKG reportedly showed sinus rhythm with borderline bradycardia with a rate of 59.  Chest x-ray was suggestive of interstitial infiltrates with redistribution of pulmonary vasculature consistent with mild pulmonary vascular congestion.      In the emergency emergency department she had systolic hypertension 190-200 range. Troponin was detectable at 0.019.  COVID-19 PCR test negative. CT chest PE protocol negative for PE.   She received 1 PRBC unit and Lasix 20 mg IV. Echocardiogram has proven normal LVEF and ventricular function.  Grade 2 diastolic dysfunction.  Normal RV function.    Hospital Course     1.  Dyspnea on exertion:    Combination of symptomatic anemia and diastolic dysfunction with congestive heart failure and aggravated by hyperthyroidism and hypertension.    Treated with gentle diuresis.  Patient also received PRBC transfusion followed by Lasix.  Currently she is doing significantly better.  Denies any shortness of breath.  Continue oral Lasix at discharge.     2.   Acute on chronic symptomatic anemia.    Known iron deficiency anemia, apparently not taking any iron supplementation for a long time.  No evidence of blood losses/bleeding.    Iron supplementation ordered.  No aspirin or NSAID.  She should follow-up in the outpatient clinic with her primary care physician.  Ferritin of 22, low iron level with high iron binding capacity.  Findings suggestive of iron deficiency anemia.  Outpatient follow-up and colonoscopy as needed.     3.   History of nodules of the thyroid, prior right lobectomy in July 2020 goiter.  apparent hyper-thyroidism currently:  Was restarted on methimazole late February 2021 due to hyperactive range thyroid testing at that time.  Note the TSH in her clinic was quite suppressed on outside labs today (<0.02) and T4 was elevated at 2.10.  It is difficult to say to what extent this is causing her symptoms.    Continue methimazole as before.  Follow-up in outpatient with endocrinology.     4.   History of type 2 diabetes on Metformin     5.   CAD with prior CABG in 2017, accelerated hypertension:   Blood pressure in the 180s to 200s systolic in the ER.    --Resume home Toprol- mg daily, lisinopril and HCTZ.    --she also is chronically on atorvastatin 80 mg daily.    --ASA 81 mg.  -- IV hydralazine and labetalol as needed     6.  History of atrial fibrillation:   Not on anticoagulation  Continue oral metoprolol succinate 100 mg daily and hold aspirin as above.  Sinus rhythm.     7.  Incidental finding of a well-circumscribed solid noncalcified pulmonary nodule in the anteromedial  right lower lobe with mean diameter of 1 cm.   --Follow-up in clinic         I personally evaluated and examined the patient on the day of discharge.    Miguel Nelson MD      Pending Results   These results will be followed up by PCP  Unresulted Labs Ordered in the Past 30 Days of this Admission     No orders found from 4/4/2021 to 5/5/2021.               Primary Care Physician   [facility]        Discharge Disposition   Discharged to home  Condition at discharge: Stable    Consultations This Hospital Stay   None    Time Spent on this Encounter   Discharge time: greater than 30 minutes.    Discharge Orders      CBC with platelets    Last Lab Result: Hemoglobin (g/dL)       Date                     Value                 05/06/2021               9.4 (L)          ----------     Basic metabolic panel     Reason for your hospital stay    Anemia, iron deficiency anemia. Mild CHF exacerbation, diastolic.     Follow-up and recommended labs and tests     Follow up with primary care provider, [facility], within 7 days for hospital follow- up.  The following labs/tests are recommended: CBC, BMP.    Follow-up with endocrinology for overactive thyroid.     Activity    Your activity upon discharge: activity as tolerated     Monitor and record    blood glucose 4 times a day, before meals and at bedtime  blood pressure daily  weight every day     Discharge Instructions    You were admitted to the hospital for shortness of breath which was a combination of iron deficiency anemia and congestion due to anemia as well as heart dysfunction.  Take furosemide (a diuretic) 20 mg daily for next 3 days and then take that on as-needed basis for lower extremity edema/shortness of breath/weight gain.  Please see your endocrinologist for history of overactive thyroid.  Please see your primary care doctor next week as a quick follow-up. Please get labs done a day before the appointment.     Diet    Follow  this diet upon discharge: Orders Placed This Encounter      Combination Diet Regular Diet Adult     Discharge Medications   Discharge Medication List as of 5/6/2021  1:19 PM      CONTINUE these medications which have CHANGED    Details   ferrous fumarate 65 mg, [place]. FE,-Vitamin C 125 mg (VITRON C)  MG TABS tablet Take 1 tablet by mouth every other day, Disp-30 tablet, R-1, E-Prescribe      furosemide (LASIX) 20 MG tablet Take 1 tablet (20 mg) by mouth daily, Disp-30 tablet, R-0, E-Prescribe      magnesium oxide (MAG-OX) 400 MG tablet Take 1 tablet (400 mg) by mouth 2 times daily, Disp-10 tablet, R-0, E-Prescribe         CONTINUE these medications which have NOT CHANGED    Details   acetaminophen (TYLENOL) 500 MG tablet Take 1,000 mg by mouth At Bedtime, Historical      albuterol (PROAIR HFA/PROVENTIL HFA/VENTOLIN HFA) 108 (90 Base) MCG/ACT inhaler Inhale 1-2 puffs into the lungs every 4 hours as needed for shortness of breath / dyspnea or wheezing, Historical      amLODIPine (NORVASC) 2.5 MG tablet Take 2.5 mg by mouth daily, Historical      aspirin 81 MG EC tablet Take 81 mg by mouth At Bedtime, Historical      atorvastatin (LIPITOR) 80 MG tablet Take 80 mg by mouth At Bedtime, Historical      azelastine (ASTELIN) 0.1 % nasal spray Spray 1 spray into both nostrils 2 times daily, Historical      cetirizine-pseudoePHEDrine ER (ZYRTEC-D) 5-120 MG 12 hr tablet Take 1 tablet by mouth daily as needed for allergies, Historical      lisinopril-hydrochlorothiazide (PRINZIDE/ZESTORETIC) 10-12.5 MG tablet Take 1 tablet by mouth daily, Historical      metFORMIN (GLUCOPHAGE) 500 MG tablet Take 500 mg by mouth daily (with breakfast), Historical      methimazole (TAPAZOLE) 5 MG tablet Take 5 mg by mouth daily, Historical      metoprolol succinate ER (TOPROL-XL) 100 MG 24 hr tablet Take 100 mg by mouth daily, Historical      multivitamin w/minerals (THERA-VIT-M) tablet Take 1 tablet by mouth daily, Historical       olopatadine (PATANOL) 0.1 % ophthalmic solution Place 1 drop into both eyes 2 times daily , Historical      sertraline (ZOLOFT) 25 MG tablet Take 25 mg by mouth daily, Historical      vitamin D3 (CHOLECALCIFEROL) 2000 units tablet Take 1 tablet by mouth every morning, Historical      amoxicillin (AMOXIL) 500 MG capsule Take 2,000 mg by mouth as needed (1 hour prior to dental work), Historical           Allergies   No Known Allergies  Data   Most Recent 3 CBC's:  Recent Labs   Lab Test 05/06/21  0700 05/05/21  1825 05/05/21  0822 05/04/21  1731 12/10/18  0654   WBC  --   --  7.0 6.5 12.7*   HGB 9.4* 8.8* 8.8* 7.2* 10.5*   MCV  --   --  80 79 87   PLT  --   --  305 312 89*      Most Recent 3 BMP's:  Recent Labs   Lab Test 05/06/21  1108 05/05/21  1825 05/05/21  1336 05/05/21  0822 12/09/18  0924 12/09/18  0924   NA  --   --   --  140  --   --    POTASSIUM 3.6 3.4 3.3* 2.8*   < >  --    CHLORIDE  --   --   --  105  --   --    CO2  --   --   --  27  --   --    BUN  --   --   --  17  --   --    CR  --   --   --  0.66  --  0.84   ANIONGAP  --   --   --  8  --   --    JOSÉ MIGUEL  --   --   --  8.6  --   --    GLC  --   --   --  106*  --   --     < > = values in this interval not displayed.     Most Recent 2 LFT's:No lab results found.  Most Recent INR's and Anticoagulation Dosing History:  Anticoagulation Dose History     Recent Dosing and Labs Latest Ref Rng & Units 12/9/2018    INR 0.86 - 1.14 1.00        Most Recent 3 Troponin's:  Recent Labs   Lab Test 05/05/21 0822 05/04/21  1731   TROPI 0.024 0.019     Most Recent Cholesterol Panel:No lab results found.  Most Recent 6 Bacteria Isolates From Any Culture (See EPIC Reports for Culture Details):No lab results found.  Most Recent TSH, T4 and A1c Labs:  Recent Labs   Lab Test 05/05/21  1336   TSH 0.03*   T4 0.96

## 2021-10-09 ENCOUNTER — HEALTH MAINTENANCE LETTER (OUTPATIENT)
Age: 76
End: 2021-10-09

## 2022-05-16 ENCOUNTER — HEALTH MAINTENANCE LETTER (OUTPATIENT)
Age: 77
End: 2022-05-16

## 2022-09-11 ENCOUNTER — HEALTH MAINTENANCE LETTER (OUTPATIENT)
Age: 77
End: 2022-09-11

## 2023-06-03 ENCOUNTER — HEALTH MAINTENANCE LETTER (OUTPATIENT)
Age: 78
End: 2023-06-03